# Patient Record
Sex: MALE | Race: WHITE | NOT HISPANIC OR LATINO | Employment: OTHER | URBAN - METROPOLITAN AREA
[De-identification: names, ages, dates, MRNs, and addresses within clinical notes are randomized per-mention and may not be internally consistent; named-entity substitution may affect disease eponyms.]

---

## 2017-01-18 ENCOUNTER — OFFICE VISIT (OUTPATIENT)
Dept: PEDIATRICS | Facility: CLINIC | Age: 67
End: 2017-01-18
Payer: COMMERCIAL

## 2017-01-18 ENCOUNTER — RADIANT APPOINTMENT (OUTPATIENT)
Dept: GENERAL RADIOLOGY | Facility: CLINIC | Age: 67
End: 2017-01-18
Attending: PHYSICIAN ASSISTANT
Payer: COMMERCIAL

## 2017-01-18 VITALS
TEMPERATURE: 98 F | HEIGHT: 68 IN | WEIGHT: 212.1 LBS | HEART RATE: 76 BPM | OXYGEN SATURATION: 96 % | SYSTOLIC BLOOD PRESSURE: 128 MMHG | BODY MASS INDEX: 32.15 KG/M2 | DIASTOLIC BLOOD PRESSURE: 70 MMHG

## 2017-01-18 DIAGNOSIS — J06.9 VIRAL URI WITH COUGH: Primary | ICD-10-CM

## 2017-01-18 DIAGNOSIS — R05.9 COUGH: ICD-10-CM

## 2017-01-18 DIAGNOSIS — J02.9 ACUTE PHARYNGITIS, UNSPECIFIED ETIOLOGY: ICD-10-CM

## 2017-01-18 LAB
DEPRECATED S PYO AG THROAT QL EIA: NORMAL
MICRO REPORT STATUS: NORMAL
SPECIMEN SOURCE: NORMAL

## 2017-01-18 PROCEDURE — 99213 OFFICE O/P EST LOW 20 MIN: CPT | Performed by: PHYSICIAN ASSISTANT

## 2017-01-18 PROCEDURE — 87081 CULTURE SCREEN ONLY: CPT | Performed by: PHYSICIAN ASSISTANT

## 2017-01-18 PROCEDURE — 71020 XR CHEST 2 VW: CPT

## 2017-01-18 PROCEDURE — 87880 STREP A ASSAY W/OPTIC: CPT | Performed by: PHYSICIAN ASSISTANT

## 2017-01-18 RX ORDER — MULTIPLE VITAMINS W/ MINERALS TAB 9MG-400MCG
1 TAB ORAL DAILY
Qty: 100 TABLET | Refills: 3 | COMMUNITY
Start: 2017-01-18 | End: 2020-01-10

## 2017-01-18 NOTE — PROGRESS NOTES
"  SUBJECTIVE:                                                    Rufus Elena is a 67 year old male who presents to clinic today for the following health issues:    Acute Illness   Acute illness concerns: cold sx  Onset: 3 weeks ago and worsened over the past three days    Fever: no    Chills/Sweats: no    Headache (location?): no    Sinus Pressure:no    Conjunctivitis:  no    Ear Pain: no    Rhinorrhea: YES    Congestion: YES    Sore Throat: YES     Cough: YES-productive of yellow sputum, productive of green sputum    Intermittently to clear throat    Wheeze: YES    Decreased Appetite: YES    Nausea: no    Vomiting: no    Diarrhea:  no    Dysuria/Freq.: YES- just had prostate surgery    Fatigue/Achiness: YES- fatigue    Sick/Strep Exposure: YES     Therapies Tried and outcome: ibuprofen    History of asthma in childhood. History of allergies. No history of bronchitis.     Problem list, Medication list, Allergies, and Medical/Social/Surgical histories reviewed in [facility] and updated as appropriate.    ROS:  ROS otherwise negative    OBJECTIVE:                                                    /70 mmHg  Pulse 76  Temp(Src) 98  F (36.7  C) (Oral)  Ht 5' 8\" (1.727 m)  Wt 212 lb 1.6 oz (96.208 kg)  BMI 32.26 kg/m2  SpO2 96%  Body mass index is 32.26 kg/(m^2).   GENERAL: alert, no distress  HENT: ear canals- normal; TMs- normal; Nose- normal; Mouth- erythemic posterior pharynx; no sinus tenderness   NECK: tonsillar LAD  RESP: lungs clear to auscultation - no rales, no rhonchi, no wheezes  CV: regular rates and rhythm, normal S1 S2, no S3 or S4 and no murmur, no click or rub    Diagnostic test results:  CXR appears NEGATIVE. Radiology review pending.  Results for orders placed or performed in visit on 01/18/17 (from the past 24 hour(s))   Strep, Rapid Screen   Result Value Ref Range    Specimen Description Throat     Rapid Strep A Screen       NEGATIVE: No Group A streptococcal antigen detected by immunoassay, " await   culture report.      Micro Report Status FINAL 01/18/2017         ASSESSMENT/PLAN:                                                    (J06.9,  B97.89) Viral URI with cough  (primary encounter diagnosis)  Comment: continue with symptomatic treatment.   Plan: XR Chest 2 Views            (J02.9) Acute pharyngitis, unspecified etiology  Comment: TC pending.   Plan: Strep, Rapid Screen, Beta strep group A culture            See Patient Instructions    Jade Baker PA-C  [facility]

## 2017-01-18 NOTE — MR AVS SNAPSHOT
After Visit Summary   1/18/2017    Rufus Elena    MRN: [id number]           Patient Information     Date Of Birth          1950        Visit Information        Provider Department      1/18/2017 2:30 PM Jade Baker PA-C [facility]        Today's Diagnoses     Acute pharyngitis, unspecified etiology    -  1     Cough           Care Instructions    Call with any persistent or worsening symptoms   Emily [phone]        Follow-ups after your visit        Your next 10 appointments already scheduled     Jan 30, 2017  9:00 AM   Return Visit with Ryan Ruiz MD   [facility] ([facility])    [address]  [address]  [facility] [address] [phone]              Who to contact     If you have questions or need follow up information about today's clinic visit or your schedule please contact [facility] directly at [phone].  Normal or non-critical lab and imaging results will be communicated to you by Promoboxxhart, letter or phone within 4 business days after the clinic has received the results. If you do not hear from us within 7 days, please contact the clinic through Promoboxxhart or phone. If you have a critical or abnormal lab result, we will notify you by phone as soon as possible.  Submit refill requests through Arrogene or call your pharmacy and they will forward the refill request to us. Please allow 3 business days for your refill to be completed.          Additional Information About Your Visit        MyChart Information     Arrogene gives you secure access to your electronic health record. If you see a primary care provider, you can also send messages to your care team and make appointments. If you have questions, please call your primary care clinic.  If you do not have a primary care provider, please call [phone] and they will assist you.        Care EveryWhere ID   "   This is your Care EveryWhere ID. This could be used by other organizations to access your [facility] medical records  [id number]        Your Vitals Were     Pulse Temperature Height BMI (Body Mass Index) Pulse Oximetry       76 98  F (36.7  C) (Oral) 5' 8\" (1.727 m) 32.26 kg/m2 96%        Blood Pressure from Last 3 Encounters:   01/18/17 128/70   12/14/16 147/74   11/28/16 124/68    Weight from Last 3 Encounters:   01/18/17 212 lb 1.6 oz (96.208 kg)   12/19/16 208 lb (94.348 kg)   12/13/16 213 lb 9.6 oz (96.888 kg)              We Performed the Following     Beta strep group A culture     Strep, Rapid Screen          Today's Medication Changes          These changes are accurate as of: 1/18/17  2:57 PM.  If you have any questions, ask your nurse or doctor.               Stop taking these medicines if you haven't already. Please contact your care team if you have questions.     ciprofloxacin 250 MG tablet   Commonly known as:  CIPRO   Stopped by:  Jade Baker PA-C           finasteride 5 MG tablet   Commonly known as:  PROSCAR   Stopped by:  Jade Baker PA-C           neomycin-bacitracin-polymyxin 5-400-5000 ointment   Stopped by:  Jade Baker PA-C           opium-belladonna 30-16.2 MG per suppository   Commonly known as:  B&O SUPPRETTES   Stopped by:  Jade Baker PA-C                    Primary Care Provider Office Phone # Fax #    Remi Wyman [phone]       [facility] [address] [place] [address]        Thank you!     Thank you for choosing [facility]  for your care. Our goal is always to provide you with excellent care. Hearing back from our patients is one way we can continue to improve our services. Please take a few minutes to complete the written survey that you may receive in the mail after your visit with us. Thank you!             Your Updated Medication List - Protect others around you: " Learn how to safely use, store and throw away your medicines at www.disposemymeds.org.          This list is accurate as of: 1/18/17  2:57 PM.  Always use your most recent med list.                   Brand Name Dispense Instructions for use    aspirin 81 MG EC tablet     90 tablet    Take 1 tablet (81 mg) by mouth daily       atorvastatin 40 MG tablet    LIPITOR    90 tablet    Take 0.5 tablets (20 mg) by mouth daily       loratadine 10 MG tablet    CLARITIN     Take 10 mg by mouth as needed for allergies       Multi-vitamin Tabs tablet     100 tablet    Take 1 tablet by mouth daily

## 2017-01-18 NOTE — NURSING NOTE
"Chief Complaint   Patient presents with     URI       Initial /70 mmHg  Pulse 76  Temp(Src) 98  F (36.7  C) (Oral)  Ht 5' 8\" (1.727 m)  Wt 212 lb 1.6 oz (96.208 kg)  BMI 32.26 kg/m2  SpO2 96% Estimated body mass index is 32.26 kg/(m^2) as calculated from the following:    Height as of this encounter: 5' 8\" (1.727 m).    Weight as of this encounter: 212 lb 1.6 oz (96.208 kg).  BP completed using cuff size: regular    "

## 2017-01-20 LAB
BACTERIA SPEC CULT: NORMAL
MICRO REPORT STATUS: NORMAL
SPECIMEN SOURCE: NORMAL

## 2017-01-30 ENCOUNTER — OFFICE VISIT (OUTPATIENT)
Dept: UROLOGY | Facility: CLINIC | Age: 67
End: 2017-01-30
Payer: COMMERCIAL

## 2017-01-30 VITALS — HEART RATE: 76 BPM | OXYGEN SATURATION: 97 % | WEIGHT: 210 LBS | BODY MASS INDEX: 31.94 KG/M2

## 2017-01-30 DIAGNOSIS — N40.0 ENLARGED PROSTATE: Primary | ICD-10-CM

## 2017-01-30 LAB
ALBUMIN UR-MCNC: 100 MG/DL
APPEARANCE UR: CLEAR
BILIRUB UR QL STRIP: NEGATIVE
COLOR UR AUTO: YELLOW
GLUCOSE UR STRIP-MCNC: NEGATIVE MG/DL
HGB UR QL STRIP: ABNORMAL
KETONES UR STRIP-MCNC: NEGATIVE MG/DL
LEUKOCYTE ESTERASE UR QL STRIP: ABNORMAL
NITRATE UR QL: NEGATIVE
PH UR STRIP: 5.5 PH (ref 5–7)
SP GR UR STRIP: >1.03 (ref 1–1.03)
URN SPEC COLLECT METH UR: ABNORMAL
UROBILINOGEN UR STRIP-ACNC: 0.2 EU/DL (ref 0.2–1)

## 2017-01-30 PROCEDURE — 99024 POSTOP FOLLOW-UP VISIT: CPT | Performed by: UROLOGY

## 2017-01-30 PROCEDURE — 51798 US URINE CAPACITY MEASURE: CPT | Performed by: UROLOGY

## 2017-01-30 PROCEDURE — 81003 URINALYSIS AUTO W/O SCOPE: CPT | Performed by: UROLOGY

## 2017-01-30 ASSESSMENT — PAIN SCALES - GENERAL: PAINLEVEL: NO PAIN (0)

## 2017-01-30 NOTE — Clinical Note
1/30/2017      RE: Rufus Elena  [address]  [facility] [address]       Office Visit Note  [facility]  [phone]    UROLOGIC DIAGNOSES:   Enlarged prostate and incomplete bladder emptying    CURRENT INTERVENTIONS:   S/P laser TURP    HISTORY:   Rufus returns to clinic today for urinalysis and bladder scan. He reports feeling very well.  He has a strong urinary stream.  No urgency or nocturia.  He is very happy with his urinary symptoms.      PAST MEDICAL HISTORY:   Past Medical History   Diagnosis Date     Benign non-nodular prostatic hyperplasia with lower urinary tract symptoms 11/9/2015     Hypertrophic cardiomyopathy (H)      Uncomplicated asthma      as child       PAST SURGICAL HISTORY:   Past Surgical History   Procedure Laterality Date     Orthopedic surgery       knee scopes bilateral     Laser revolix photoselective vaporization prostate N/A 12/13/2016     Procedure: LASER REVOLIX / QUANTA PHOTOSELECTIVE VAPORIZATION PROSTATE;  Surgeon: Ryan Ruiz MD;  Location:  OR       FAMILY HISTORY:   Family History   Problem Relation Age of Onset     CANCER Mother      Pancreatic     CANCER Father      Bladder       SOCIAL HISTORY:   Social History   Substance Use Topics     Smoking status: Never Smoker      Smokeless tobacco: Never Used     Alcohol Use: Yes      Comment: 15-20 beers per week       Current Outpatient Prescriptions   Medication     multivitamin, therapeutic with minerals (MULTI-VITAMIN) TABS tablet     aspirin 81 MG EC tablet     atorvastatin (LIPITOR) 40 MG tablet     loratadine (CLARITIN) 10 MG tablet     No current facility-administered medications for this visit.         PHYSICAL EXAM:    There were no vitals taken for this visit.    HEENT: Normocephalic and atraumatic   Cardiac: Not done  Back/Flank: Not done  CNS/PNS: Not done  Respiratory: Normal non-labored breathing  Abdomen: Soft nontender and nondistended  Peripheral Vascular: Not done  Mental  Status: Not done    Penis: Not done  Scrotal Skin: Not done  Testicles: Not done  Epididymis: Not done  Digital Rectal Exam:     Cystoscopy: Not done    Imaging: None    Urinalysis: UA RESULTS:  Recent Labs   Lab Test  11/13/14   1043   COLOR  Yellow   APPEARANCE  Clear   URINEGLC  Negative   URINEBILI  Negative   URINEKETONE  Negative   SG  1.025   UBLD  Negative   URINEPH  6.0   PROTEIN  Negative   UROBILINOGEN  0.2   NITRITE  Negative   LEUKEST  Negative       PSA:     Post Void Residual: 10mL    Other labs: None today      IMPRESSION:  Doing well    PLAN:  He is doing very well after laser TURP.  I will plan on seeing him back again in 1 year for a PSA urinalysis and bladder scan.    Total Time: 10 minutes                                      Total in Consultation: 10 minutes       Ryan Ruzi M.D.

## 2017-01-30 NOTE — PROGRESS NOTES
Office Visit Note  [facility]  [phone]    UROLOGIC DIAGNOSES:   Enlarged prostate and incomplete bladder emptying    CURRENT INTERVENTIONS:   S/P laser TURP    HISTORY:   Rufus returns to clinic today for urinalysis and bladder scan. He reports feeling very well.  He has a strong urinary stream.  No urgency or nocturia.  He is very happy with his urinary symptoms.      PAST MEDICAL HISTORY:   Past Medical History   Diagnosis Date     Benign non-nodular prostatic hyperplasia with lower urinary tract symptoms 11/9/2015     Hypertrophic cardiomyopathy (H)      Uncomplicated asthma      as child       PAST SURGICAL HISTORY:   Past Surgical History   Procedure Laterality Date     Orthopedic surgery       knee scopes bilateral     Laser revolix photoselective vaporization prostate N/A 12/13/2016     Procedure: LASER REVOLIX / QUANTA PHOTOSELECTIVE VAPORIZATION PROSTATE;  Surgeon: Ryan Ruiz MD;  Location:  OR       FAMILY HISTORY:   Family History   Problem Relation Age of Onset     CANCER Mother      Pancreatic     CANCER Father      Bladder       SOCIAL HISTORY:   Social History   Substance Use Topics     Smoking status: Never Smoker      Smokeless tobacco: Never Used     Alcohol Use: Yes      Comment: 15-20 beers per week       Current Outpatient Prescriptions   Medication     multivitamin, therapeutic with minerals (MULTI-VITAMIN) TABS tablet     aspirin 81 MG EC tablet     atorvastatin (LIPITOR) 40 MG tablet     loratadine (CLARITIN) 10 MG tablet     No current facility-administered medications for this visit.         PHYSICAL EXAM:    There were no vitals taken for this visit.    HEENT: Normocephalic and atraumatic   Cardiac: Not done  Back/Flank: Not done  CNS/PNS: Not done  Respiratory: Normal non-labored breathing  Abdomen: Soft nontender and nondistended  Peripheral Vascular: Not done  Mental Status: Not done    Penis: Not done  Scrotal Skin: Not done  Testicles: Not  done  Epididymis: Not done  Digital Rectal Exam:     Cystoscopy: Not done    Imaging: None    Urinalysis: UA RESULTS:  Recent Labs   Lab Test  11/13/14   1043   COLOR  Yellow   APPEARANCE  Clear   URINEGLC  Negative   URINEBILI  Negative   URINEKETONE  Negative   SG  1.025   UBLD  Negative   URINEPH  6.0   PROTEIN  Negative   UROBILINOGEN  0.2   NITRITE  Negative   LEUKEST  Negative       PSA:     Post Void Residual: 10mL    Other labs: None today      IMPRESSION:  Doing well    PLAN:  He is doing very well after laser TURP.  I will plan on seeing him back again in 1 year for a PSA urinalysis and bladder scan.    Total Time: 10 minutes                                      Total in Consultation: 10 minutes       Ryan Ruiz M.D.

## 2017-02-22 ENCOUNTER — DOCUMENTATION ONLY (OUTPATIENT)
Dept: CARDIOLOGY | Facility: CLINIC | Age: 67
End: 2017-02-22

## 2017-02-22 DIAGNOSIS — E78.5 HLD (HYPERLIPIDEMIA): Primary | ICD-10-CM

## 2017-02-22 DIAGNOSIS — R93.1 HIGH CORONARY ARTERY CALCIUM SCORE: ICD-10-CM

## 2017-02-22 DIAGNOSIS — E78.00 PURE HYPERCHOLESTEROLEMIA: ICD-10-CM

## 2017-02-22 LAB
ALT SERPL W P-5'-P-CCNC: 33 U/L (ref 0–70)
CHOLEST SERPL-MCNC: 189 MG/DL
HDLC SERPL-MCNC: 65 MG/DL
LDLC SERPL CALC-MCNC: 107 MG/DL
NONHDLC SERPL-MCNC: 124 MG/DL
TRIGL SERPL-MCNC: 85 MG/DL

## 2017-02-22 PROCEDURE — 84460 ALANINE AMINO (ALT) (SGPT): CPT | Performed by: INTERNAL MEDICINE

## 2017-02-22 PROCEDURE — 80061 LIPID PANEL: CPT | Performed by: INTERNAL MEDICINE

## 2017-02-22 PROCEDURE — 36415 COLL VENOUS BLD VENIPUNCTURE: CPT | Performed by: INTERNAL MEDICINE

## 2017-02-22 NOTE — PROGRESS NOTES
Results of lipid panel 2/22/17 shown below. Drawn in follow up from last OV 10/6/16 as Lipitor was decreased from 40 mg daily to 20 mg daily due to myalgias at that time. Will route to Dr. Burciaga for review. JOSE Dawson RN    Component      Latest Ref Rng & Units 2/22/2017   Cholesterol      <200 mg/dL 189   Triglycerides      <150 mg/dL 85   HDL Cholesterol      >39 mg/dL 65   LDL Cholesterol Calculated      <100 mg/dL 107 (H)   Non HDL Cholesterol      <130 mg/dL 124   ALT      0 - 70 U/L 33

## 2017-02-22 NOTE — PROGRESS NOTES
Thanks, LDL better than 2015. Continue lipitor, ideally would have increased dose to 40 mg qd but for now continue 20 mg qd to avoid intolerant myalgias.    Cole    If he increases lipitor I recommend a repeat lipid panel with alt in 2 months.    Thanks  Cole

## 2017-02-23 NOTE — PROGRESS NOTES
Call made to patient to update him with results of lipid panel from 2/22/17 and recommendations per Dr. Burciaga below. Patient stated that he has been tolerating Lipitor 20 mg daily well and he would now like to try the increased dose at 40 mg one more time. I advised that if his myalgias or aches return that he give us a call. Will route to Dr. Burciaga for review. JOSE Dawson RN    Order placed for recheck of fasting lipids and ALT in 2 months per Dr. Burciaga. Call made to patient to update him. Unable to reach pt. Left message requesting call back. JOSE Dawson, RN

## 2017-05-04 ENCOUNTER — TELEPHONE (OUTPATIENT)
Dept: CARDIOLOGY | Facility: CLINIC | Age: 67
End: 2017-05-04

## 2017-05-04 NOTE — TELEPHONE ENCOUNTER
Voice message left of overdue labs due, after increase in statin by Dr. Burciaga. On 2/23/17 from lipitor 20 mg daily to 40 mg daily.   Call back number left.

## 2017-05-18 ENCOUNTER — TELEPHONE (OUTPATIENT)
Dept: CARDIOLOGY | Facility: CLINIC | Age: 67
End: 2017-05-18

## 2017-05-18 NOTE — TELEPHONE ENCOUNTER
Patient left voice message he is questioning why he needed to have fasting lipids done last labs in February 2017.  Reviewed chart and left V.M. Reason for labs is due to statin change lipitor 20mg to 40mg and recommend FLPand ALT 6-8 weeks after medication change to make sure he is on the correct statin dose.  Ok to have done at primary office.  Instructed patient to call back if further questions.

## 2017-06-09 ENCOUNTER — TELEPHONE (OUTPATIENT)
Dept: CARDIOLOGY | Facility: CLINIC | Age: 67
End: 2017-06-09

## 2017-06-09 DIAGNOSIS — E78.5 HLD (HYPERLIPIDEMIA): ICD-10-CM

## 2017-06-09 DIAGNOSIS — E78.5 HYPERLIPIDEMIA WITH TARGET LDL LESS THAN 100: Primary | ICD-10-CM

## 2017-06-09 LAB
ALT SERPL W P-5'-P-CCNC: 28 U/L (ref 0–70)
CHOLEST SERPL-MCNC: 169 MG/DL
HDLC SERPL-MCNC: 61 MG/DL
LDLC SERPL CALC-MCNC: 98 MG/DL
NONHDLC SERPL-MCNC: 108 MG/DL
TRIGL SERPL-MCNC: 48 MG/DL

## 2017-06-09 PROCEDURE — 80061 LIPID PANEL: CPT | Performed by: INTERNAL MEDICINE

## 2017-06-09 PROCEDURE — 84460 ALANINE AMINO (ALT) (SGPT): CPT | Performed by: INTERNAL MEDICINE

## 2017-06-09 PROCEDURE — 36415 COLL VENOUS BLD VENIPUNCTURE: CPT | Performed by: INTERNAL MEDICINE

## 2017-06-09 NOTE — TELEPHONE ENCOUNTER
"Lipids done. Questioning dosage of Lipitor. Per telephone note from 2/23/17, patient was going to try to increase the dosage from 20 mg to 40 mg daily at that time.  Called patient states he is taking the 40 mg daily. Does have some Myalgias when on the 40 mg daily, which he did not have on the 20 mg dose. In the back and large muscles, but stated \" I can tolerate it\".   LDL was 107 on 20 mg of Lipitor, Now LDL 98 on Lipitor 40 mg daily.  Will send to Dr. Burciaga to review and advise.   Next f/u due October 2017 with ECHO.      "

## 2017-06-12 NOTE — TELEPHONE ENCOUNTER
I recommend checking CK- if normal and symptoms tolerable then continue 40 mg qd of lipitor.    Thanks  Cole

## 2017-07-12 DIAGNOSIS — E78.5 HYPERLIPIDEMIA WITH TARGET LDL LESS THAN 100: ICD-10-CM

## 2017-07-12 PROCEDURE — 82550 ASSAY OF CK (CPK): CPT | Performed by: INTERNAL MEDICINE

## 2017-07-12 PROCEDURE — 36415 COLL VENOUS BLD VENIPUNCTURE: CPT | Performed by: INTERNAL MEDICINE

## 2017-07-13 LAB — CK SERPL-CCNC: 99 U/L (ref 30–300)

## 2017-10-13 DIAGNOSIS — E78.00 PURE HYPERCHOLESTEROLEMIA: ICD-10-CM

## 2017-10-13 DIAGNOSIS — R93.1 HIGH CORONARY ARTERY CALCIUM SCORE: ICD-10-CM

## 2017-10-13 NOTE — TELEPHONE ENCOUNTER
Called pt - left voice message  Request for atorvastatin refill from [facility] stating pt takes 40mg daily  EPIC notes pt takes 1/2tab atorvastatin 40mg=20mg daily  Left request for pt to call back to clarify.

## 2017-10-24 RX ORDER — ATORVASTATIN CALCIUM 40 MG/1
40 TABLET, FILM COATED ORAL DAILY
Qty: 90 TABLET | Refills: 3 | Status: SHIPPED | OUTPATIENT
Start: 2017-10-24 | End: 2018-11-07

## 2017-10-24 NOTE — TELEPHONE ENCOUNTER
Pt called back -  Taking atorvastatin 40mg every day.  Tolerating well.  Rx to [facility] per pt request.

## 2017-10-27 ENCOUNTER — HOSPITAL ENCOUNTER (OUTPATIENT)
Dept: CARDIOLOGY | Facility: CLINIC | Age: 67
Discharge: HOME | End: 2017-10-27
Attending: INTERNAL MEDICINE | Admitting: INTERNAL MEDICINE
Payer: COMMERCIAL

## 2017-10-27 ENCOUNTER — TELEPHONE (OUTPATIENT)
Dept: CARDIOLOGY | Facility: CLINIC | Age: 67
End: 2017-10-27

## 2017-10-27 DIAGNOSIS — I77.819 AORTIC DILATATION (H): ICD-10-CM

## 2017-10-27 PROCEDURE — 93306 TTE W/DOPPLER COMPLETE: CPT | Mod: 26 | Performed by: INTERNAL MEDICINE

## 2017-10-27 PROCEDURE — 25500064 ZZH RX 255 OP 636: Performed by: INTERNAL MEDICINE

## 2017-10-27 PROCEDURE — 40000264 ECHO COMPLETE WITH OPTISON

## 2017-10-27 RX ADMIN — HUMAN ALBUMIN MICROSPHERES AND PERFLUTREN 6 ML: 10; .22 INJECTION, SOLUTION INTRAVENOUS at 15:00

## 2017-10-27 NOTE — TELEPHONE ENCOUNTER
10- Echo results Has a Follow up OV w Dr. Burciaga 11-  Interpretation Summary     There is moderate concentric left ventricular hypertrophy. Apical hypertrophy  is present. These findings are consistent with apical hypertrophic  cardiomyopathy. Left ventricular systolic function is normal. The visual  ejection fraction is estimated at 60-65%. No regional wall motion  abnormalities noted. There is no thrombus seen in the left ventricle.  The right ventricle is normal size. The right ventricular systolic function is  normal.  Trace to mild mitral and tricuspid regurgitation.  Mild dilatation of the aortic root and ascending thoracic aorta.  No pericardial effusion.  In comparison to the previous report dated 09/14/2016, the findings are  similar.  _____________________________________________________________________________    Patient notified results via My Chart

## 2017-11-22 ENCOUNTER — OFFICE VISIT (OUTPATIENT)
Dept: CARDIOLOGY | Facility: CLINIC | Age: 67
End: 2017-11-22
Attending: INTERNAL MEDICINE
Payer: COMMERCIAL

## 2017-11-22 VITALS
HEART RATE: 60 BPM | SYSTOLIC BLOOD PRESSURE: 131 MMHG | HEIGHT: 68 IN | BODY MASS INDEX: 32.52 KG/M2 | DIASTOLIC BLOOD PRESSURE: 84 MMHG | WEIGHT: 214.6 LBS

## 2017-11-22 DIAGNOSIS — I42.2 APICAL VARIANT HYPERTROPHIC CARDIOMYOPATHY (H): Primary | ICD-10-CM

## 2017-11-22 DIAGNOSIS — I77.810 AORTIC ROOT DILATATION (H): ICD-10-CM

## 2017-11-22 DIAGNOSIS — I42.2 HYPERTROPHIC CARDIOMYOPATHY (H): ICD-10-CM

## 2017-11-22 DIAGNOSIS — E78.5 HYPERLIPIDEMIA WITH TARGET LDL LESS THAN 100: ICD-10-CM

## 2017-11-22 PROCEDURE — 99214 OFFICE O/P EST MOD 30 MIN: CPT | Performed by: INTERNAL MEDICINE

## 2017-11-22 RX ORDER — MV-MIN/FA/VIT K/LUTEIN/ZEAXANT 200MCG-5MG
CAPSULE ORAL
COMMUNITY

## 2017-11-22 NOTE — PROGRESS NOTES
REASON FOR CLINIC VISIT:  Followup, mildly dilated aortic root, ascending aorta, hypertrophic cardiomyopathy.      HISTORY OF PRESENT ILLNESS:  Mr. Elena is a very pleasant 67-year-old gentleman with history of apical hypertrophic cardiomyopathy diagnosed more than 10 years ago in [place] and mildly dilated aortic root and ascending aorta measuring 3.9 cm.  Today is coming for routine followup.      Repeat echocardiogram shows stable size of aortic root and ascending aorta.  He also has mild nonobstructive coronary artery disease on the basis of coronary CT angiogram that showed 25% of LAD disease with trivial plaque in the left circumflex and right coronary with calcium score of 413, which was 80th percentile for his age.  In terms of sudden cardiac death risk stratification in the setting of hypertrophic cardiomyopathy, the maximal LV thickness is 16 mm.  There are some small patchy delayed enhancement along the left ventricular apex.  No NSVT on Holter evaluation. No personal history of syncope family of sudden cardiac death and normal blood pressure response to exercise.  He also has dyslipidemia with LDL of 135.  He was put on Lipitor 40 mg daily, but then due to some myalgias, he decreased the dose to 20 mg, but his LDL was still elevated and he went back to 40 mg daily.  The latest lipid panel shows LDL of 98.        The patient tells me that overall he feels quite well.  He is fairly active.  He walks at least 2 miles.  No chest discomfort or shortness or with physical activity.  No presyncope, syncope or dizziness.  He still notes some overall generalized body aches over the shoulders and legs.  No tobacco exposure.  To be noted, his son has also been diagnosed with hypertrophic cardiomyopathy and works as an internist in [place].      PHYSICAL EXAMINATION:   VITAL SIGNS:  Blood pressure 131/84, heart rate 60 regular, weight 214 pounds, BMI 32.7.   GENERAL:  The patient appears pleasant, comfortable.   NECK:   Normal JVP, no bruit.   CARDIOVASCULAR SYSTEM:  S1, S2 normal, no murmur, rub or gallop.   RESPIRATORY SYSTEM:  Clear to auscultation bilaterally.     ABDOMEN:  Soft, nontender.   EXTREMITIES:  No pitting pedal edema.   NEUROLOGICAL:  Alert and oriented x3.   PSYCH:  Normal affect.   SKIN:  No obvious rash.     HEENT:  No pallor, icterus.      ASSESSMENT AND PLAN:  A very pleasant 67-year-old gentleman with apical hypertrophic cardiomyopathy, essentially asymptomatic from it with no major risk features for sudden cardiac death as noted above, no personal history of syncope.  Overall cardiac status-wise he is doing quite well.  He has mildly dilated ascending aorta, aortic root and mild nonobstructive coronary artery disease.  Some of the symptoms are suggestive of statin-induced myalgias and I recommend a statin holiday for 7-10 days and if he recovers completely from the symptoms, he should call our clinic and I would recommend using an alternative statin, like pravastatin.  In case he does not notice significant improvement in symptoms, he should resume Lipitor at the current dose.        Regarding mildly dilated aortic root and ascending aorta, they appear stable in size and we can recheck them in about 2 years' time and I can see him in the clinic at that time, sooner if any change in clinical status, especially if any exertionally related symptoms.         JOSE FRANCISCO WEI MD             D: 2017 13:30   T: 2017 14:31   MT: [id number]      Name:     TONI SWANN   MRN:      [id number]        Account:      [id number]   :      1950           Service Date: 2017      Document: [id number]

## 2017-11-22 NOTE — PROGRESS NOTES
HPI and Plan:   See dictation(#[id number])    Orders Placed This Encounter   Procedures     Follow-Up with Cardiologist     Echocardiogram       Orders Placed This Encounter   Medications     Multiple Vitamins-Minerals (PRESERVISION AREDS 2+MULTI VIT) CAPS     Sig: Dose is unknown but takes 1 pill a day       There are no discontinued medications.      Encounter Diagnoses   Name Primary?     Hypertrophic cardiomyopathy (H)      Apical variant hypertrophic cardiomyopathy (H) Yes     Hyperlipidemia with target LDL less than 100      Aortic root dilatation (H)        CURRENT MEDICATIONS:  Current Outpatient Prescriptions   Medication Sig Dispense Refill     Multiple Vitamins-Minerals (PRESERVISION AREDS 2+MULTI VIT) CAPS Dose is unknown but takes 1 pill a day       atorvastatin (LIPITOR) 40 MG tablet Take 1 tablet (40 mg) by mouth daily 90 tablet 3     multivitamin, therapeutic with minerals (MULTI-VITAMIN) TABS tablet Take 1 tablet by mouth daily 100 tablet 3     aspirin 81 MG EC tablet Take 1 tablet (81 mg) by mouth daily 90 tablet 3     loratadine (CLARITIN) 10 MG tablet Take 10 mg by mouth as needed for allergies         ALLERGIES     Allergies   Allergen Reactions     Seasonal Allergies        PAST MEDICAL HISTORY:  Past Medical History:   Diagnosis Date     Benign non-nodular prostatic hyperplasia with lower urinary tract symptoms 11/9/2015     Hypertrophic cardiomyopathy (H)      Uncomplicated asthma     as child       PAST SURGICAL HISTORY:  Past Surgical History:   Procedure Laterality Date     LASER REVOLIX PHOTOSELECTIVE VAPORIZATION PROSTATE N/A 12/13/2016    Procedure: LASER REVOLIX / QUANTA PHOTOSELECTIVE VAPORIZATION PROSTATE;  Surgeon: Ryan Ruiz MD;  Location: [facility]     ORTHOPEDIC SURGERY      knee scopes bilateral     PROSTATE SURGERY         FAMILY HISTORY:  Family History   Problem Relation Age of Onset     CANCER Mother      Pancreatic     CANCER Father      Bladder       SOCIAL  "HISTORY:  Social History     Social History     Marital status:      Spouse name: N/A     Number of children: N/A     Years of education: N/A     Social History Main Topics     Smoking status: Never Smoker     Smokeless tobacco: Never Used     Alcohol use Yes      Comment: 15-20 beers per week     Drug use: No     Sexual activity: Yes     Partners: Female     Other Topics Concern     Caffeine Concern No     3 coffee in the mornings     Exercise Yes     walking/biking- 2 hours a day.     Social History Narrative       Review of Systems:  Skin:  Negative       Eyes:  Positive for contacts    ENT:  Negative      Respiratory:  Negative       Cardiovascular:  Negative      Gastroenterology: Negative      Genitourinary:  not assessed      Musculoskeletal:  Positive for joint pain    Neurologic:  Negative      Psychiatric:  Negative      Heme/Lymph/Imm:  Positive for allergies    Endocrine:  Negative        Physical Exam:  Vitals: /84  Pulse 60  Ht 1.727 m (5' 8\")  Wt 97.3 kg (214 lb 9.6 oz)  BMI 32.63 kg/m2    Constitutional:           Skin:             Head:           Eyes:           Lymph:      ENT:           Neck:           Respiratory:            Cardiac:                                                           GI:           Extremities and Muscular Skeletal:                 Neurological:           Psych:             CC  Cole Burciaga MD  [address]  [place] [address]                  "

## 2017-11-22 NOTE — MR AVS SNAPSHOT
After Visit Summary   11/22/2017    Rufus Elena    MRN: [id number]           Patient Information     Date Of Birth          1950        Visit Information        Provider Department      11/22/2017 12:45 PM Cole Burciaga MD [facility]        Today's Diagnoses     Apical variant hypertrophic cardiomyopathy (H)    -  1    Hypertrophic cardiomyopathy (H)        Hyperlipidemia with target LDL less than 100        Aortic root dilatation (H)           Follow-ups after your visit        Additional Services     Follow-Up with Cardiologist                 Future tests that were ordered for you today     Open Future Orders        Priority Expected Expires Ordered    Echocardiogram Routine 11/12/2019 11/23/2019 11/22/2017    Follow-Up with Cardiologist Routine 11/22/2019 12/12/2019 11/22/2017            Who to contact     If you have questions or need follow up information about today's clinic visit or your schedule please contact [facility] directly at [phone].  Normal or non-critical lab and imaging results will be communicated to you by BayPacketshart, letter or phone within 4 business days after the clinic has received the results. If you do not hear from us within 7 days, please contact the clinic through Electronic Braillert or phone. If you have a critical or abnormal lab result, we will notify you by phone as soon as possible.  Submit refill requests through Embedded Chat or call your pharmacy and they will forward the refill request to us. Please allow 3 business days for your refill to be completed.          Additional Information About Your Visit        MyChart Information     Embedded Chat gives you secure access to your electronic health record. If you see a primary care provider, you can also send messages to your care team and make appointments. If you have questions, please call your primary care clinic.  If you do not have a primary care  "provider, please call [phone] and they will assist you.        Care EveryWhere ID     This is your Care EveryWhere ID. This could be used by other organizations to access your [place] medical records  [id number]        Your Vitals Were     Pulse Height BMI (Body Mass Index)             60 1.727 m (5' 8\") 32.63 kg/m2          Blood Pressure from Last 3 Encounters:   11/22/17 131/84   01/18/17 128/70   12/14/16 147/74    Weight from Last 3 Encounters:   11/22/17 97.3 kg (214 lb 9.6 oz)   01/30/17 95.3 kg (210 lb)   01/18/17 96.2 kg (212 lb 1.6 oz)              We Performed the Following     Follow-Up with Cardiologist        Primary Care Provider Office Phone # Fax #    Remi Wyman [phone] [address] [place] [address]        Equal Access to Services     [facility]: Hadii aad ku hadasho Soomaali, waaxda luqadaha, qaybta kaalmada adeegyada, waxay nicholein hayjob jimenez . So [facility] [phone].    ATENCIÓN: Si habla español, tiene a proctor disposición servicios gratuitos de asistencia lingüística. Llame al [phone].    We comply with applicable federal civil rights laws and [place] laws. We do not discriminate on the basis of race, color, national origin, age, disability, sex, sexual orientation, or gender identity.            Thank you!     Thank you for choosing [facility]  for your care. Our goal is always to provide you with excellent care. Hearing back from our patients is one way we can continue to improve our services. Please take a few minutes to complete the written survey that you may receive in the mail after your visit with us. Thank you!             Your Updated Medication List - Protect others around you: Learn how to safely use, store and throw away your medicines at www.disposemymeds.org.          This list is accurate as of: 11/22/17  1:20 PM.  Always use your most recent med list.             "       Brand Name Dispense Instructions for use Diagnosis    aspirin 81 MG EC tablet     90 tablet    Take 1 tablet (81 mg) by mouth daily        atorvastatin 40 MG tablet    LIPITOR    90 tablet    Take 1 tablet (40 mg) by mouth daily    Pure hypercholesterolemia, High coronary artery calcium score       loratadine 10 MG tablet    CLARITIN     Take 10 mg by mouth as needed for allergies        * PRESERVISION AREDS 2+MULTI VIT Caps      Dose is unknown but takes 1 pill a day        * Multi-vitamin Tabs tablet     100 tablet    Take 1 tablet by mouth daily        * Notice:  This list has 2 medication(s) that are the same as other medications prescribed for you. Read the directions carefully, and ask your doctor or other care provider to review them with you.

## 2017-11-22 NOTE — LETTER
11/22/2017      Remi Wyman MD  [address] [place] [address]      RE: Rufus Elena       Dear Colleague,    I had the pleasure of seeing Rufus Elena in the [facility].    REASON FOR CLINIC VISIT:  Followup, mildly dilated aortic root, ascending aorta, hypertrophic cardiomyopathy.      HISTORY OF PRESENT ILLNESS:  Mr. Elena is a very pleasant 67-year-old gentleman with history of apical hypertrophic cardiomyopathy diagnosed more than 10 years ago in [place] and mildly dilated aortic root and ascending aorta measuring 3.9 cm.  Today is coming for routine followup.      Repeat echocardiogram shows stable size of aortic root and ascending aorta.  He also has mild nonobstructive coronary artery disease on the basis of coronary CT angiogram that showed 25% of LAD disease with trivial plaque in the left circumflex and right coronary with calcium score of 413, which was 80th percentile for his age.  In terms of sudden cardiac death risk stratification in the setting of hypertrophic cardiomyopathy, the maximal LV thickness is 16 mm.  There are some small patchy delayed enhancement along the left ventricular apex.  No NSVT on Holter evaluation. No personal history of syncope family of sudden cardiac death and normal blood pressure response to exercise.  He also has dyslipidemia with LDL of 135.  He was put on Lipitor 40 mg daily, but then due to some myalgias, he decreased the dose to 20 mg, but his LDL was still elevated and he went back to 40 mg daily.  The latest lipid panel shows LDL of 98.        The patient tells me that overall he feels quite well.  He is fairly active.  He walks at least 2 miles.  No chest discomfort or shortness or with physical activity.  No presyncope, syncope or dizziness.  He still notes some overall generalized body aches over the shoulders and legs.  No tobacco exposure.  To be noted, his son has also been diagnosed with hypertrophic  cardiomyopathy and works as an internist in [place].      PHYSICAL EXAMINATION:   VITAL SIGNS:  Blood pressure 131/84, heart rate 60 regular, weight 214 pounds, BMI 32.7.   GENERAL:  The patient appears pleasant, comfortable.   NECK:  Normal JVP, no bruit.   CARDIOVASCULAR SYSTEM:  S1, S2 normal, no murmur, rub or gallop.   RESPIRATORY SYSTEM:  Clear to auscultation bilaterally.     ABDOMEN:  Soft, nontender.   EXTREMITIES:  No pitting pedal edema.   NEUROLOGICAL:  Alert and oriented x3.   PSYCH:  Normal affect.   SKIN:  No obvious rash.     HEENT:  No pallor, icterus.      ASSESSMENT AND PLAN:  A very pleasant 67-year-old gentleman with apical hypertrophic cardiomyopathy, essentially asymptomatic from it with no major risk features for sudden cardiac death as noted above, no personal history of syncope.  Overall cardiac status-wise he is doing quite well.  He has mildly dilated ascending aorta, aortic root and mild nonobstructive coronary artery disease.  Some of the symptoms are suggestive of statin-induced myalgias and I recommend a statin holiday for 7-10 days and if he recovers completely from the symptoms, he should call our clinic and I would recommend using an alternative statin, like pravastatin.  In case he does not notice significant improvement in symptoms, he should resume Lipitor at the current dose.        Regarding mildly dilated aortic root and ascending aorta, they appear stable in size and we can recheck them in about 2 years' time and I can see him in the clinic at that time, sooner if any change in clinical status, especially if any exertionally related symptoms.       Outpatient Encounter Prescriptions as of 11/22/2017   Medication Sig Dispense Refill     Multiple Vitamins-Minerals (PRESERVISION AREDS 2+MULTI VIT) CAPS Dose is unknown but takes 1 pill a day       atorvastatin (LIPITOR) 40 MG tablet Take 1 tablet (40 mg) by mouth daily 90 tablet 3     multivitamin, therapeutic with minerals  (MULTI-VITAMIN) TABS tablet Take 1 tablet by mouth daily 100 tablet 3     aspirin 81 MG EC tablet Take 1 tablet (81 mg) by mouth daily 90 tablet 3     loratadine (CLARITIN) 10 MG tablet Take 10 mg by mouth as needed for allergies       No facility-administered encounter medications on file as of 11/22/2017.        Again, thank you for allowing me to participate in the care of your patient.      Sincerely,    Cole Burciaga MD     [facility]

## 2018-03-04 ENCOUNTER — HEALTH MAINTENANCE LETTER (OUTPATIENT)
Age: 68
End: 2018-03-04

## 2018-03-21 ENCOUNTER — OFFICE VISIT (OUTPATIENT)
Dept: UROLOGY | Facility: CLINIC | Age: 68
End: 2018-03-21
Payer: COMMERCIAL

## 2018-03-21 VITALS — WEIGHT: 214 LBS | BODY MASS INDEX: 32.43 KG/M2 | OXYGEN SATURATION: 97 % | HEART RATE: 56 BPM | HEIGHT: 68 IN

## 2018-03-21 DIAGNOSIS — N40.0 ENLARGED PROSTATE: ICD-10-CM

## 2018-03-21 DIAGNOSIS — N40.0 ENLARGED PROSTATE: Primary | ICD-10-CM

## 2018-03-21 PROCEDURE — 99213 OFFICE O/P EST LOW 20 MIN: CPT | Performed by: UROLOGY

## 2018-03-21 PROCEDURE — 84153 ASSAY OF PSA TOTAL: CPT | Performed by: UROLOGY

## 2018-03-21 PROCEDURE — 36415 COLL VENOUS BLD VENIPUNCTURE: CPT | Performed by: UROLOGY

## 2018-03-21 ASSESSMENT — PAIN SCALES - GENERAL: PAINLEVEL: NO PAIN (0)

## 2018-03-21 NOTE — PROGRESS NOTES
"Office Visit Note  [facility]  [phone]    UROLOGIC DIAGNOSES:   Enlarged prostate and history of incomplete bladder emptying    CURRENT INTERVENTIONS:   Laser TURP in 2016    HISTORY:   Rufus returns to clinic today for follow-up on enlarged prostate. He reports feeling well. He has a good urinary stream and no urinary complaints. He has not yet had a PSA rechecked recently.      PAST MEDICAL HISTORY:   Past Medical History:   Diagnosis Date     Benign non-nodular prostatic hyperplasia with lower urinary tract symptoms 11/9/2015     Hypertrophic cardiomyopathy (H)      Uncomplicated asthma     as child       PAST SURGICAL HISTORY:   Past Surgical History:   Procedure Laterality Date     LASER REVOLIX PHOTOSELECTIVE VAPORIZATION PROSTATE N/A 12/13/2016    Procedure: LASER REVOLIX / QUANTA PHOTOSELECTIVE VAPORIZATION PROSTATE;  Surgeon: Ryna Ruiz MD;  Location: [facility] OR     ORTHOPEDIC SURGERY      knee scopes bilateral     PROSTATE SURGERY         FAMILY HISTORY:   Family History   Problem Relation Age of Onset     CANCER Mother      Pancreatic     CANCER Father      Bladder       SOCIAL HISTORY:   Social History   Substance Use Topics     Smoking status: Never Smoker     Smokeless tobacco: Never Used     Alcohol use Yes      Comment: 15-20 beers per week       Current Outpatient Prescriptions   Medication     Multiple Vitamins-Minerals (PRESERVISION AREDS 2+MULTI VIT) CAPS     atorvastatin (LIPITOR) 40 MG tablet     multivitamin, therapeutic with minerals (MULTI-VITAMIN) TABS tablet     aspirin 81 MG EC tablet     loratadine (CLARITIN) 10 MG tablet     No current facility-administered medications for this visit.          PHYSICAL EXAM:    Pulse 56  Ht 1.727 m (5' 8\")  Wt 97.1 kg (214 lb)  SpO2 97%  BMI 32.54 kg/m2    HEENT: Normocephalic and atraumatic   Cardiac: Not done  Back/Flank: Not done  CNS/PNS: Not done  Respiratory: Normal non-labored breathing  Abdomen: Soft nontender " and nondistended  Peripheral Vascular: Not done  Mental Status: Not done    Penis: Not done  Scrotal Skin: Not done  Testicles: Not done  Epididymis: Not done  Digital Rectal Exam:     Cystoscopy: Not done    Imaging: None    Urinalysis: UA RESULTS:  Recent Labs   Lab Test  01/30/17   0918   COLOR  Yellow   APPEARANCE  Clear   URINEGLC  Negative   URINEBILI  Negative   URINEKETONE  Negative   SG  >1.030   UBLD  Large*   URINEPH  5.5   PROTEIN  100*   UROBILINOGEN  0.2   NITRITE  Negative   LEUKEST  Small*       PSA:     Post Void Residual:     Other labs: None today      IMPRESSION:  Doing well    PLAN:  He is doing very well with no urinary complaints. He does need to have his PSA checked. He will have this done at the lab today and we will contact him with results. If the PSA today is normal he will be a little follow-up with his primary care provider from now on for annual PSA checks    Total Time: 10 minutes                                      Total in Consultation: 10 minutes      Ryan Ruiz M.D.

## 2018-03-21 NOTE — LETTER
3/21/2018       RE: Rufus Elena  [address]  [facility] [facility] [address]     Dear Colleague,    Thank you for referring your patient, Rufus Elena, to the [facility] [place] [facility]. Please see a copy of my visit note below.    Office Visit Note  [facility]  [phone]    UROLOGIC DIAGNOSES:   Enlarged prostate and history of incomplete bladder emptying    CURRENT INTERVENTIONS:   Laser TURP in 2016    HISTORY:   Rufus returns to clinic today for follow-up on enlarged prostate. He reports feeling well. He has a good urinary stream and no urinary complaints. He has not yet had a PSA rechecked recently.      PAST MEDICAL HISTORY:   Past Medical History:   Diagnosis Date     Benign non-nodular prostatic hyperplasia with lower urinary tract symptoms 11/9/2015     Hypertrophic cardiomyopathy (H)      Uncomplicated asthma     as child       PAST SURGICAL HISTORY:   Past Surgical History:   Procedure Laterality Date     LASER REVOLIX PHOTOSELECTIVE VAPORIZATION PROSTATE N/A 12/13/2016    Procedure: LASER REVOLIX / QUANTA PHOTOSELECTIVE VAPORIZATION PROSTATE;  Surgeon: Ryan Ruiz MD;  Location: [facility] OR     ORTHOPEDIC SURGERY      knee scopes bilateral     PROSTATE SURGERY         FAMILY HISTORY:   Family History   Problem Relation Age of Onset     CANCER Mother      Pancreatic     CANCER Father      Bladder       SOCIAL HISTORY:   Social History   Substance Use Topics     Smoking status: Never Smoker     Smokeless tobacco: Never Used     Alcohol use Yes      Comment: 15-20 beers per week       Current Outpatient Prescriptions   Medication     Multiple Vitamins-Minerals (PRESERVISION AREDS 2+MULTI VIT) CAPS     atorvastatin (LIPITOR) 40 MG tablet     multivitamin, therapeutic with minerals (MULTI-VITAMIN) TABS tablet     aspirin 81 MG EC tablet     loratadine (CLARITIN) 10 MG tablet     No current  "facility-administered medications for this visit.          PHYSICAL EXAM:    Pulse 56  Ht 1.727 m (5' 8\")  Wt 97.1 kg (214 lb)  SpO2 97%  BMI 32.54 kg/m2    HEENT: Normocephalic and atraumatic   Cardiac: Not done  Back/Flank: Not done  CNS/PNS: Not done  Respiratory: Normal non-labored breathing  Abdomen: Soft nontender and nondistended  Peripheral Vascular: Not done  Mental Status: Not done    Penis: Not done  Scrotal Skin: Not done  Testicles: Not done  Epididymis: Not done  Digital Rectal Exam:     Cystoscopy: Not done    Imaging: None    Urinalysis: UA RESULTS:  Recent Labs   Lab Test  01/30/17   0918   COLOR  Yellow   APPEARANCE  Clear   URINEGLC  Negative   URINEBILI  Negative   URINEKETONE  Negative   SG  >1.030   UBLD  Large*   URINEPH  5.5   PROTEIN  100*   UROBILINOGEN  0.2   NITRITE  Negative   LEUKEST  Small*       PSA:     Post Void Residual:     Other labs: None today      IMPRESSION:  Doing well    PLAN:  He is doing very well with no urinary complaints. He does need to have his PSA checked. He will have this done at the lab today and we will contact him with results. If the PSA today is normal he will be a little follow-up with his primary care provider from now on for annual PSA checks    Total Time: 10 minutes                                      Total in Consultation: 10 minutes        Again, thank you for allowing me to participate in the care of your patient.      Sincerely,    Ryan Ruiz MD      "

## 2018-03-21 NOTE — MR AVS SNAPSHOT
After Visit Summary   3/21/2018    Rufus Elena    MRN: [id number]           Patient Information     Date Of Birth          1950        Visit Information        Provider Department      3/21/2018 9:00 AM Ryan Ruiz MD [facility]        Today's Diagnoses     Enlarged prostate    -  1       Follow-ups after your visit        Follow-up notes from your care team     Return if symptoms worsen or fail to improve, for PSA today, I will call with results.      Your next 10 appointments already scheduled     Mar 21, 2018  9:00 AM CDT   Return Visit with Ryan Ruiz MD   [facility] ([facility])    [address]  [address]  [facility] [address] [phone]              Future tests that were ordered for you today     Open Future Orders        Priority Expected Expires Ordered    PSA tumor marker Routine  3/21/2019 3/21/2018            Who to contact     If you have questions or need follow up information about today's clinic visit or your schedule please contact [facility] directly at [phone].  Normal or non-critical lab and imaging results will be communicated to you by Blottrhart, letter or phone within 4 business days after the clinic has received the results. If you do not hear from us within 7 days, please contact the clinic through Blottrhart or phone. If you have a critical or abnormal lab result, we will notify you by phone as soon as possible.  Submit refill requests through LootWorks or call your pharmacy and they will forward the refill request to us. Please allow 3 business days for your refill to be completed.          Additional Information About Your Visit        Blottrhart Information     LootWorks gives you secure access to your electronic health record. If you see a primary care provider, you can also send  "messages to your care team and make appointments. If you have questions, please call your primary care clinic.  If you do not have a primary care provider, please call [phone] and they will assist you.        Care EveryWhere ID     This is your Care EveryWhere ID. This could be used by other organizations to access your [facility] medical records  [id number]        Your Vitals Were     Pulse Height Pulse Oximetry BMI (Body Mass Index)          56 1.727 m (5' 8\") 97% 32.54 kg/m2         Blood Pressure from Last 3 Encounters:   11/22/17 131/84   01/18/17 128/70   12/14/16 147/74    Weight from Last 3 Encounters:   03/21/18 97.1 kg (214 lb)   11/22/17 97.3 kg (214 lb 9.6 oz)   01/30/17 95.3 kg (210 lb)               Primary Care Provider Office Phone # Fax #    Remi Wyman [phone] [address] [place] [address]        Equal Access to Services     [facility]: Hadii aad ku hadasho Soomaali, waaxda luqadaha, qaybta kaalmada adeegyada, waxay nicholein hayjob jimenez . So [facility] [phone].    ATENCIÓN: Si habla español, tiene a proctor disposición servicios gratuitos de asistencia lingüística. LlSelect [facility] [phone].    We comply with applicable federal civil rights laws and [place] laws. We do not discriminate on the basis of race, color, national origin, age, disability, sex, sexual orientation, or gender identity.            Thank you!     Thank you for choosing [facility]IC [place]  for your care. Our goal is always to provide you with excellent care. Hearing back from our patients is one way we can continue to improve our services. Please take a few minutes to complete the written survey that you may receive in the mail after your visit with us. Thank you!             Your Updated Medication List - Protect others around you: Learn how to safely use, store and throw away your medicines at www.disposemymeds.org.        "   This list is accurate as of 3/21/18  8:59 AM.  Always use your most recent med list.                   Brand Name Dispense Instructions for use Diagnosis    aspirin 81 MG EC tablet     90 tablet    Take 1 tablet (81 mg) by mouth daily        atorvastatin 40 MG tablet    LIPITOR    90 tablet    Take 1 tablet (40 mg) by mouth daily    Pure hypercholesterolemia, High coronary artery calcium score       loratadine 10 MG tablet    CLARITIN     Take 10 mg by mouth as needed for allergies        * PRESERVISION AREDS 2+MULTI VIT Caps      Dose is unknown but takes 1 pill a day        * Multi-vitamin Tabs tablet     100 tablet    Take 1 tablet by mouth daily        * Notice:  This list has 2 medication(s) that are the same as other medications prescribed for you. Read the directions carefully, and ask your doctor or other care provider to review them with you.

## 2018-03-22 LAB — PSA SERPL-MCNC: 1.92 UG/L (ref 0–4)

## 2018-11-07 DIAGNOSIS — E78.00 PURE HYPERCHOLESTEROLEMIA: ICD-10-CM

## 2018-11-07 DIAGNOSIS — R93.1 HIGH CORONARY ARTERY CALCIUM SCORE: ICD-10-CM

## 2018-11-07 RX ORDER — ATORVASTATIN CALCIUM 40 MG/1
40 TABLET, FILM COATED ORAL DAILY
Qty: 90 TABLET | Refills: 3 | Status: SHIPPED | OUTPATIENT
Start: 2018-11-07 | End: 2019-09-26

## 2018-12-11 ENCOUNTER — OFFICE VISIT (OUTPATIENT)
Dept: PEDIATRICS | Facility: CLINIC | Age: 68
End: 2018-12-11
Payer: COMMERCIAL

## 2018-12-11 VITALS
HEIGHT: 69 IN | WEIGHT: 202.1 LBS | DIASTOLIC BLOOD PRESSURE: 72 MMHG | HEART RATE: 62 BPM | TEMPERATURE: 98.9 F | SYSTOLIC BLOOD PRESSURE: 132 MMHG | RESPIRATION RATE: 16 BRPM | BODY MASS INDEX: 29.93 KG/M2

## 2018-12-11 DIAGNOSIS — I42.2 APICAL VARIANT HYPERTROPHIC CARDIOMYOPATHY (H): ICD-10-CM

## 2018-12-11 DIAGNOSIS — N40.0 ENLARGED PROSTATE: ICD-10-CM

## 2018-12-11 DIAGNOSIS — N52.33 ERECTILE DYSFUNCTION FOLLOWING URETHRAL SURGERY: ICD-10-CM

## 2018-12-11 DIAGNOSIS — Z00.00 MEDICARE ANNUAL WELLNESS VISIT, SUBSEQUENT: Primary | ICD-10-CM

## 2018-12-11 DIAGNOSIS — E78.5 HYPERLIPIDEMIA WITH TARGET LDL LESS THAN 100: ICD-10-CM

## 2018-12-11 DIAGNOSIS — Z23 NEED FOR PROPHYLACTIC VACCINATION AND INOCULATION AGAINST INFLUENZA: ICD-10-CM

## 2018-12-11 PROCEDURE — 99213 OFFICE O/P EST LOW 20 MIN: CPT | Mod: 25 | Performed by: INTERNAL MEDICINE

## 2018-12-11 PROCEDURE — 90662 IIV NO PRSV INCREASED AG IM: CPT | Performed by: INTERNAL MEDICINE

## 2018-12-11 PROCEDURE — 90471 IMMUNIZATION ADMIN: CPT | Performed by: INTERNAL MEDICINE

## 2018-12-11 PROCEDURE — 99397 PER PM REEVAL EST PAT 65+ YR: CPT | Mod: 25 | Performed by: INTERNAL MEDICINE

## 2018-12-11 RX ORDER — SILDENAFIL 100 MG/1
50-100 TABLET, FILM COATED ORAL DAILY PRN
Qty: 4 TABLET | Refills: 3 | Status: SHIPPED | OUTPATIENT
Start: 2018-12-11 | End: 2020-01-10

## 2018-12-11 ASSESSMENT — ENCOUNTER SYMPTOMS
JOINT SWELLING: 0
PARESTHESIAS: 0
HEADACHES: 0
NAUSEA: 0
NERVOUS/ANXIOUS: 0
DYSURIA: 0
WEAKNESS: 0
EYE PAIN: 0
HEMATURIA: 0
PALPITATIONS: 0
ARTHRALGIAS: 0
HEARTBURN: 1
SORE THROAT: 0
FREQUENCY: 0
HEMATOCHEZIA: 0
ABDOMINAL PAIN: 0
SHORTNESS OF BREATH: 0
CHILLS: 0
MYALGIAS: 0
COUGH: 0

## 2018-12-11 ASSESSMENT — MIFFLIN-ST. JEOR: SCORE: 1691.06

## 2018-12-11 ASSESSMENT — ACTIVITIES OF DAILY LIVING (ADL): CURRENT_FUNCTION: NO ASSISTANCE NEEDED

## 2018-12-11 NOTE — PATIENT INSTRUCTIONS
Preventive Health Recommendations:     See your health care provider every year to    Review health changes.     Discuss preventive care.      Review your medicines if your doctor has prescribed any.    Talk with your health care provider about whether you should have a test to screen for prostate cancer (PSA).    Every 3 years, have a diabetes test (fasting glucose). If you are at risk for diabetes, you should have this test more often.    Every 5 years, have a cholesterol test. Have this test more often if you are at risk for high cholesterol or heart disease.     Every 10 years, have a colonoscopy. Or, have a yearly FIT test (stool test). These exams will check for colon cancer.    Talk to with your health care provider about screening for Abdominal Aortic Aneurysm if you have a family history of AAA or have a history of smoking.  Shots:     Get a flu shot each year.     Get a tetanus shot every 10 years.     Talk to your doctor about your pneumonia vaccines. There are now two you should receive - Pneumovax (PPSV 23) and Prevnar (PCV 13).    Talk to your pharmacist about a shingles vaccine.     Talk to your doctor about the hepatitis B vaccine.  Nutrition:     Eat at least 5 servings of fruits and vegetables each day.     Eat whole-grain bread, whole-wheat pasta and brown rice instead of white grains and rice.     Get adequate Calcium and Vitamin D.   Lifestyle    Exercise for at least 150 minutes a week (30 minutes a day, 5 days a week). This will help you control your weight and prevent disease.     Limit alcohol to one drink per day.     No smoking.     Wear sunscreen to prevent skin cancer.     See your dentist every six months for an exam and cleaning.     See your eye doctor every 1 to 2 years to screen for conditions such as glaucoma, macular degeneration and cataracts.    Personalized Prevention Plan  You are due for the preventive services outlined below.  Your care team is available to assist you in  scheduling these services.  If you have already completed any of these items, please share that information with your care team to update in your medical record.    Health Maintenance Due   Topic Date Due     Diptheria Tetanus Pertussis (DTAP/TDAP/TD) Vaccine (1 - Tdap) 01/11/1959     Hepatitis C Screening  01/11/1970     Zoster (Chicken Pox) Vaccine (1 of 2) 01/11/2002     FALL RISK ASSESSMENT  01/11/2017     Pneumococcal Vaccine (1 of 2 - PCV13) 01/11/2017     AORTIC ANEURYSM SCREENING (SYSTEM ASSIGNED)  01/11/2017     Discuss Advance Directive Planning  07/02/2017     Depression Assessment 2 - yearly  08/22/2017     Colonoscopy - every 5 years  01/03/2018     Flu Vaccine (1) 09/01/2018           Patient Education     Sildenafil tablets (Erectile Dysfunction)  Brand Name: Viagra  What is this medicine?  SILDENAFIL (reese DEN a nely) is used to treat erection problems in men.  How should I use this medicine?  Take this medicine by mouth with a glass of water. Follow the directions on the prescription label. The dose is usually taken 1 hour before sexual activity. You should not take the dose more than once per day. Do not take your medicine more often than directed.  Talk to your pediatrician regarding the use of this medicine in children. This medicine is not used in children for this condition.  What side effects may I notice from receiving this medicine?  Side effects that you should report to your doctor or health care professional as soon as possible:    allergic reactions like skin rash, itching or hives, swelling of the face, lips, or tongue    breathing problems    changes in hearing    changes in vision    chest pain    fast, irregular heartbeat    prolonged or painful erection    seizures  Side effects that usually do not require medical attention (report to your doctor or health care professional if they continue or are bothersome):    back pain    dizziness    flushing    headache    indigestion    muscle  aches    nausea    stuffy or runny nose  What may interact with this medicine?  Do not take this medicine with any of the following medications:    cisapride    nitrates like amyl nitrite, isosorbide dinitrate, isosorbide mononitrate, nitroglycerin    riociguat  This medicine may also interact with the following medications:    antiviral medicines for HIV or AIDS    bosentan    certain medicines for benign prostatic hyperplasia (BPH)    certain medicines for blood pressure    certain medicines for fungal infections like ketoconazole and itraconazole    cimetidine    erythromycin    rifampin  What if I miss a dose?  This does not apply. Do not take double or extra doses.  Where should I keep my medicine?  Keep out of reach of children.  Store at room temperature between 15 and 30 degrees C (59 and 86 degrees F). Throw away any unused medicine after the expiration date.  What should I tell my health care provider before I take this medicine?  They need to know if you have any of these conditions:    bleeding disorders    eye or vision problems, including a rare inherited eye disease called retinitis pigmentosa    anatomical deformation of the penis, Peyronie's disease, or history of priapism (painful and prolonged erection)    heart disease, angina, a history of heart attack, irregular heart beats, or other heart problems    high or low blood pressure    history of blood diseases, like sickle cell anemia or leukemia    history of stomach bleeding    kidney disease    liver disease    stroke    an unusual or allergic reaction to sildenafil, other medicines, foods, dyes, or preservatives    pregnant or trying to get pregnant    breast-feeding  What should I watch for while using this medicine?  If you notice any changes in your vision while taking this drug, call your doctor or health care professional as soon as possible. Stop using this medicine and call your health care provider right away if you have a loss of sight  in one or both eyes.  Contact your doctor or health care professional right away if you have an erection that lasts longer than 4 hours or if it becomes painful. This may be a sign of a serious problem and must be treated right away to prevent permanent damage.  If you experience symptoms of nausea, dizziness, chest pain or arm pain upon initiation of sexual activity after taking this medicine, you should refrain from further activity and call your doctor or health care professional as soon as possible.  Do not drink alcohol to excess (examples, 5 glasses of wine or 5 shots of whiskey) when taking this medicine. When taken in excess, alcohol can increase your chances of getting a headache or getting dizzy, increasing your heart rate or lowering your blood pressure.  Using this medicine does not protect you or your partner against HIV infection (the virus that causes AIDS) or other sexually transmitted diseases.  NOTE:This sheet is a summary. It may not cover all possible information. If you have questions about this medicine, talk to your doctor, pharmacist, or health care provider. Copyright  2018 [employer]

## 2018-12-11 NOTE — PROGRESS NOTES
"SUBJECTIVE:   Rufus Elena is a 68 year old male who presents for Preventive Visit.      Are you in the first 12 months of your Medicare coverage?  Yes,  Visual Acuity:  Right Eye: 10/20   Left Eye: 10/40  Both Eyes: 10/20. Pt wears  Contact lens    Annual Wellness Visit     In general, how would you rate your overall health?  Good    Frequency of exercise:  6-7 days/week    Duration of exercise:  Greater than 60 minutes    Do you usually eat at least 4 servings of fruit and vegetables a day, include whole grains    & fiber and avoid regularly eating high fat or \"junk\" foods?  Yes    Taking medications regularly:  Yes    Ability to successfully perform activities of daily living:  No assistance needed    Home Safety:  No safety concerns identified    Hearing Impairment:  Difficulty following a conversation in a noisy restaurant or crowded room    In the past 6 months, have you been bothered by leaking of urine?  No    In general, how would you rate your overall mental or emotional health?  Excellent    PHQ-2 Total Score: 0    Additional concerns today:  No    Do you feel safe in your environment? Yes    Do you have a Health Care Directive? No: Advance care planning was reviewed with patient; patient declined at this time.      Fall risk       Cognitive Screening   1) Repeat 3 items (Leader, Season, Table)    2) Clock draw: NORMAL  3) 3 item recall: Recalls 3 objects  Results: 3 items recalled: COGNITIVE IMPAIRMENT LESS LIKELY    Mini-CogTM Copyright ADELA Benton. Licensed by the author for use in [facility]; reprinted with permission (al@.[facility]). All rights reserved.      Do you have sleep apnea, excessive snoring or daytime drowsiness?: no    Reviewed and updated as needed this visit by clinical staff  Allergies         Reviewed and updated as needed this visit by Provider        Social History     Tobacco Use     Smoking status: Never Smoker     Smokeless tobacco: Never Used   Substance Use Topics "     Alcohol use: Yes     Comment: 15-20 beers per week       Alcohol Use 12/11/2018   If you drink alcohol do you typically have greater than 3 drinks per day OR greater than 7 drinks per week? Yes   No flowsheet data found.        Genitourinary symptoms      Duration: sicne prostate procesure    Description:  erectyle dysfinction    Intensity:  mild    Accompanying signs and symptoms (fever/discharge/nausea/vomiting/back or abdominal pain):  None    History (frequent UTI's/kidney stones/prostate problems): None  Sexually active: YES    Precipitating or alleviating factors: None    Therapies tried and outcome: none    Patient oule like to discuss trying viagra no sx DTds. no pain. no other symptoms.     Current providers sharing in care for this patient include:   Patient Care Team:  Remi Wyman MD as PCP - General    The following health maintenance items are reviewed in Epic and correct as of today:  Health Maintenance   Topic Date Due     DTAP/TDAP/TD IMMUNIZATION (1 - Tdap) 01/11/1959     HEPATITIS C SCREENING  01/11/1970     ZOSTER IMMUNIZATION (1 of 2) 01/11/2002     FALL RISK ASSESSMENT  01/11/2017     PNEUMOVAX IMMUNIZATION 65+ LOW/MEDIUM RISK (1 of 2 - PCV13) 01/11/2017     AORTIC ANEURYSM SCREENING (SYSTEM ASSIGNED)  01/11/2017     ADVANCE DIRECTIVE PLANNING Q5 YRS  07/02/2017     PHQ-2 Q1 YR  08/22/2017     COLONOSCOPY Q5 YR  01/03/2018     INFLUENZA VACCINE (1) 09/01/2018     LIPID SCREEN Q5 YR MALE (SYSTEM ASSIGNED)  06/09/2022     IPV IMMUNIZATION  Aged Out     MENINGITIS IMMUNIZATION  Aged Out     BP Readings from Last 3 Encounters:   12/11/18 132/72   11/22/17 131/84   01/18/17 128/70    Wt Readings from Last 3 Encounters:   12/11/18 91.7 kg (202 lb 1.6 oz)   03/21/18 97.1 kg (214 lb)   11/22/17 97.3 kg (214 lb 9.6 oz)                      Review of Systems   Constitutional: Negative for chills.   HENT: Positive for hearing loss. Negative for congestion, ear pain and sore throat.    Eyes:  "Positive for visual disturbance. Negative for pain.   Respiratory: Negative for cough and shortness of breath.    Cardiovascular: Negative for chest pain, palpitations and peripheral edema.   Gastrointestinal: Positive for heartburn. Negative for abdominal pain, hematochezia and nausea.   Genitourinary: Positive for impotence. Negative for discharge, dysuria, frequency, genital sores, hematuria and urgency.   Musculoskeletal: Negative for arthralgias, joint swelling and myalgias.   Skin: Negative for rash.   Neurological: Negative for weakness, headaches and paresthesias.   Psychiatric/Behavioral: Negative for mood changes. The patient is not nervous/anxious.      reviwed above with patient, due for hearing and vision check. heartburn is well managed with as needed over-the-counter medications.     OBJECTIVE:   /72   Pulse 62   Temp 98.9  F (37.2  C) (Oral)   Resp 16   Ht 1.759 m (5' 9.25\")   Wt 91.7 kg (202 lb 1.6 oz)   BMI 29.63 kg/m   Estimated body mass index is 32.54 kg/m  as calculated from the following:    Height as of 3/21/18: 1.727 m (5' 8\").    Weight as of 3/21/18: 97.1 kg (214 lb).  Physical Exam  GENERAL: healthy, alert and no distress  EYES: Eyes grossly normal to inspection, PERRL and conjunctivae and sclerae normal  HENT: ear canals and TM's normal, nose and mouth without ulcers or lesions  NECK: no adenopathy, no asymmetry, masses, or scars and thyroid normal to palpation  RESP: lungs clear to auscultation - no rales, rhonchi or wheezes  CV: regular rate and rhythm, normal S1 S2, no S3 or S4, no murmur, click or rub, no peripheral edema and peripheral pulses strong  ABDOMEN: soft, nontender, no hepatosplenomegaly, no masses and bowel sounds normal  MS: no gross musculoskeletal defects noted, no edema  SKIN: no suspicious lesions or rashes  NEURO: Normal strength and tone, mentation intact and speech normal  PSYCH: mentation appears normal, affect normal/bright    Results for orders " placed or performed in visit on 03/21/18   PSA tumor marker   Result Value Ref Range    PSA 1.92 0 - 4 ug/L         ASSESSMENT / PLAN:     (Z00.00) Medicare annual wellness visit, subsequent  (primary encounter diagnosis)  Comment: d/w pt preventative care measures including seat belt use, bike helmet, moderation of EtOH, avoiding tobacco, avoiding excessive sun exposure/sunscreen, wt management or wt loss if BMI > 30, need to screen for lipid disorders, mood disorders, CAD risk factors, etc. Also discussed accident prevention and future RHM schedule.   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 reviwed cardiologys' reccs, not on nitrites   Plan: **Comprehensive metabolic panel FUTURE anytime,        Lipid panel reflex to direct LDL Fasting          (E78.5) Hyperlipidemia with target LDL less than 100  Comment: discussed with patient (or patient's parents/caregiver) pathophysiology of condition and treatment options.  reviewed labs, medications, diet ,etc.  due to labs  Plan: **Comprehensive metabolic panel FUTURE anytime,        Lipid panel reflex to direct LDL Fasting          (N40.0) Enlarged prostate  Comment: discussed with patient (or patient's parents/caregiver) pathophysiology of condition and treatment options.  reviwed urology's reccs.   Plan: **Comprehensive metabolic panel FUTURE anytime,        Prostate spec antigen screen          (N52.33) Erectile dysfunction following urethral surgery  Comment: d/w pt pathophysiology of ED and treatment modalitis. The patient desires Viagra to treat his erectile dysfunction. History and physical exam has not disclosed any obvious treatable cause of this complaint. He is informed that Viagra is usually not covered by insurance. It is available on a  "fee-for-service cost basis, and is relatively expensive. He can start with 50 mg dose, and increase to 100 mg if necessary. The method of use 1 hour prior to anticipated sexual activity is explained. He should not use any more than one tablet in a 24 hour period. The side effects of possible headache, flushing, dyspepsia and transient changes in vision have been explained.   The patient is not taking nitrates, and denies he has access to nitrates in any form at any time. I have counseled him that taking Viagra with nitrates of any form can cause death.   We have also discussed the fact that there have been some deaths in patients after taking Viagra, felt due to the exertion of intercourse rather than the drug itself. The patient is aware of this, and accepts whatever unknown degree of risk there is in this aspect.   Plan: sildenafil (VIAGRA) 100 MG tablet          (Z23) Need for prophylactic vaccination and inoculation against influenza  Plan: FLU VACCINE, INCREASED ANTIGEN, PRESV FREE, AGE        65+ [35117], Vaccine Administration, Initial         [51028]      End of Life Planning:  Patient currently has an advanced directive: Yes.  Practitioner is supportive of decision.    COUNSELING:  Reviewed preventive health counseling, as reflected in patient instructions    BP Readings from Last 1 Encounters:   11/22/17 131/84     Estimated body mass index is 32.54 kg/m  as calculated from the following:    Height as of 3/21/18: 1.727 m (5' 8\").    Weight as of 3/21/18: 97.1 kg (214 lb).           reports that  has never smoked. he has never used smokeless tobacco.      Appropriate preventive services were discussed with this patient, including applicable screening as appropriate for cardiovascular disease, diabetes, osteopenia/osteoporosis, and glaucoma.  As appropriate for age/gender, discussed screening for colorectal cancer, prostate cancer, breast cancer, and cervical cancer. Checklist reviewing preventive services " available has been given to the patient.    Reviewed patients plan of care and provided an AVS. The Basic Care Plan (routine screening as documented in Health Maintenance) for Rufus meets the Care Plan requirement. This Care Plan has been established and reviewed with the Patient.    Counseling Resources:  ATP IV Guidelines  Pooled Cohorts Equation Calculator  Breast Cancer Risk Calculator  FRAX Risk Assessment  ICSI Preventive Guidelines  Dietary Guidelines for Americans, 2010  USDA's MyPlate  ASA Prophylaxis  Lung CA Screening    I have discussed with patient the risks, benefits, medications, treatment options and modalities.   I have instructed the patient to call or schedule a follow-up appointment if any problems or failure to improve.       Remi Wyman MD  [facility]      Injectable Influenza Immunization Documentation    1.  Is the person to be vaccinated sick today?   No    2. Does the person to be vaccinated have an allergy to a component   of the vaccine?   No  Egg Allergy Algorithm Link    3. Has the person to be vaccinated ever had a serious reaction   to influenza vaccine in the past?   No    4. Has the person to be vaccinated ever had Guillain-Barré syndrome?   No    Form completed by Pt

## 2018-12-18 DIAGNOSIS — Z00.00 MEDICARE ANNUAL WELLNESS VISIT, SUBSEQUENT: ICD-10-CM

## 2018-12-18 DIAGNOSIS — N40.0 ENLARGED PROSTATE: ICD-10-CM

## 2018-12-18 DIAGNOSIS — E78.5 HYPERLIPIDEMIA WITH TARGET LDL LESS THAN 100: ICD-10-CM

## 2018-12-18 DIAGNOSIS — I42.2 APICAL VARIANT HYPERTROPHIC CARDIOMYOPATHY (H): ICD-10-CM

## 2018-12-18 LAB
ALBUMIN SERPL-MCNC: 3.5 G/DL (ref 3.4–5)
ALP SERPL-CCNC: 60 U/L (ref 40–150)
ALT SERPL W P-5'-P-CCNC: 29 U/L (ref 0–70)
ANION GAP SERPL CALCULATED.3IONS-SCNC: 4 MMOL/L (ref 3–14)
AST SERPL W P-5'-P-CCNC: 30 U/L (ref 0–45)
BILIRUB SERPL-MCNC: 0.6 MG/DL (ref 0.2–1.3)
BUN SERPL-MCNC: 19 MG/DL (ref 7–30)
CALCIUM SERPL-MCNC: 8.8 MG/DL (ref 8.5–10.1)
CHLORIDE SERPL-SCNC: 108 MMOL/L (ref 94–109)
CHOLEST SERPL-MCNC: 161 MG/DL
CO2 SERPL-SCNC: 27 MMOL/L (ref 20–32)
CREAT SERPL-MCNC: 1.03 MG/DL (ref 0.66–1.25)
GFR SERPL CREATININE-BSD FRML MDRD: 72 ML/MIN/1.7M2
GLUCOSE SERPL-MCNC: 103 MG/DL (ref 70–99)
HDLC SERPL-MCNC: 68 MG/DL
LDLC SERPL CALC-MCNC: 83 MG/DL
NONHDLC SERPL-MCNC: 93 MG/DL
POTASSIUM SERPL-SCNC: 4.3 MMOL/L (ref 3.4–5.3)
PROT SERPL-MCNC: 6.5 G/DL (ref 6.8–8.8)
PSA SERPL-ACNC: 2.11 UG/L (ref 0–4)
SODIUM SERPL-SCNC: 139 MMOL/L (ref 133–144)
TRIGL SERPL-MCNC: 48 MG/DL

## 2018-12-18 PROCEDURE — 80061 LIPID PANEL: CPT | Performed by: INTERNAL MEDICINE

## 2018-12-18 PROCEDURE — G0103 PSA SCREENING: HCPCS | Performed by: INTERNAL MEDICINE

## 2018-12-18 PROCEDURE — 80053 COMPREHEN METABOLIC PANEL: CPT | Performed by: INTERNAL MEDICINE

## 2018-12-18 PROCEDURE — 36415 COLL VENOUS BLD VENIPUNCTURE: CPT | Performed by: INTERNAL MEDICINE

## 2019-09-26 DIAGNOSIS — R93.1 HIGH CORONARY ARTERY CALCIUM SCORE: ICD-10-CM

## 2019-09-26 DIAGNOSIS — E78.00 PURE HYPERCHOLESTEROLEMIA: ICD-10-CM

## 2019-09-26 RX ORDER — ATORVASTATIN CALCIUM 40 MG/1
40 TABLET, FILM COATED ORAL DAILY
Qty: 90 TABLET | Refills: 3 | Status: SHIPPED | OUTPATIENT
Start: 2019-09-26 | End: 2020-12-10

## 2019-10-22 ENCOUNTER — ALLIED HEALTH/NURSE VISIT (OUTPATIENT)
Dept: NURSING | Facility: CLINIC | Age: 69
End: 2019-10-22
Payer: MEDICARE

## 2019-10-22 DIAGNOSIS — Z23 ENCOUNTER FOR IMMUNIZATION: Primary | ICD-10-CM

## 2019-10-22 PROCEDURE — G0008 ADMIN INFLUENZA VIRUS VAC: HCPCS | Mod: 59

## 2019-10-22 PROCEDURE — 99207 ZZC NO CHARGE NURSE ONLY: CPT

## 2019-10-22 PROCEDURE — G0009 ADMIN PNEUMOCOCCAL VACCINE: HCPCS | Mod: 59

## 2019-10-22 PROCEDURE — 90714 TD VACC NO PRESV 7 YRS+ IM: CPT

## 2019-10-22 PROCEDURE — 90670 PCV13 VACCINE IM: CPT

## 2019-10-22 PROCEDURE — 90471 IMMUNIZATION ADMIN: CPT

## 2019-10-22 PROCEDURE — 90662 IIV NO PRSV INCREASED AG IM: CPT

## 2019-11-08 ENCOUNTER — HEALTH MAINTENANCE LETTER (OUTPATIENT)
Age: 69
End: 2019-11-08

## 2019-12-31 ENCOUNTER — HOSPITAL ENCOUNTER (OUTPATIENT)
Dept: CARDIOLOGY | Facility: CLINIC | Age: 69
Discharge: HOME | End: 2019-12-31
Attending: INTERNAL MEDICINE | Admitting: INTERNAL MEDICINE
Payer: MEDICARE

## 2019-12-31 DIAGNOSIS — I77.810 AORTIC ROOT DILATATION (H): ICD-10-CM

## 2019-12-31 PROCEDURE — 25500064 ZZH RX 255 OP 636: Performed by: INTERNAL MEDICINE

## 2019-12-31 PROCEDURE — 40000264 ECHOCARDIOGRAM COMPLETE

## 2019-12-31 PROCEDURE — 93306 TTE W/DOPPLER COMPLETE: CPT | Mod: 26 | Performed by: INTERNAL MEDICINE

## 2019-12-31 RX ADMIN — HUMAN ALBUMIN MICROSPHERES AND PERFLUTREN 5 ML: 10; .22 INJECTION, SOLUTION INTRAVENOUS at 10:45

## 2020-01-10 ENCOUNTER — OFFICE VISIT (OUTPATIENT)
Dept: CARDIOLOGY | Facility: CLINIC | Age: 70
End: 2020-01-10
Payer: MEDICARE

## 2020-01-10 VITALS
SYSTOLIC BLOOD PRESSURE: 128 MMHG | BODY MASS INDEX: 31.47 KG/M2 | HEIGHT: 69 IN | HEART RATE: 58 BPM | WEIGHT: 212.5 LBS | DIASTOLIC BLOOD PRESSURE: 84 MMHG

## 2020-01-10 DIAGNOSIS — I42.2 HYPERTROPHIC CARDIOMYOPATHY (H): Primary | ICD-10-CM

## 2020-01-10 PROCEDURE — 99213 OFFICE O/P EST LOW 20 MIN: CPT | Performed by: INTERNAL MEDICINE

## 2020-01-10 ASSESSMENT — MIFFLIN-ST. JEOR: SCORE: 1733.23

## 2020-01-10 NOTE — LETTER
1/10/2020    Remi Wyman MD  [address] [place] [address]    RE: Rufus Ulices       Dear Colleague,    I had the pleasure of seeing Rufus Ulices in the [facility].    HPI and Plan:   See dictation(#[id number])    Orders Placed This Encounter   Procedures     Follow-Up with Cardiologist       No orders of the defined types were placed in this encounter.      Medications Discontinued During This Encounter   Medication Reason     aspirin 81 MG EC tablet Stopped by Patient     multivitamin, therapeutic with minerals (MULTI-VITAMIN) TABS tablet Medication Reconciliation Clean Up     sildenafil (VIAGRA) 100 MG tablet Medication Reconciliation Clean Up         Encounter Diagnosis   Name Primary?     Hypertrophic cardiomyopathy (H) Yes       CURRENT MEDICATIONS:  Current Outpatient Medications   Medication Sig Dispense Refill     atorvastatin (LIPITOR) 40 MG tablet Take 1 tablet (40 mg) by mouth daily 90 tablet 3     loratadine (CLARITIN) 10 MG tablet Take 10 mg by mouth as needed for allergies       Multiple Vitamins-Minerals (PRESERVISION AREDS 2+MULTI VIT) CAPS Dose is unknown but takes 1 pill a day         ALLERGIES     Allergies   Allergen Reactions     Seasonal Allergies        PAST MEDICAL HISTORY:  Past Medical History:   Diagnosis Date     Benign non-nodular prostatic hyperplasia with lower urinary tract symptoms 11/9/2015     Hypertrophic cardiomyopathy (H)      Uncomplicated asthma     as child       PAST SURGICAL HISTORY:  Past Surgical History:   Procedure Laterality Date     LASER REVOLIX PHOTOSELECTIVE VAPORIZATION PROSTATE N/A 12/13/2016    Procedure: LASER REVOLIX / QUANTA PHOTOSELECTIVE VAPORIZATION PROSTATE;  Surgeon: Ryan Ruiz MD;  Location: [facility] OR     ORTHOPEDIC SURGERY      knee scopes bilateral     PROSTATE SURGERY         FAMILY HISTORY:  Family History   Problem Relation Age of Onset     Cancer Mother         Pancreatic     Cancer  Father         Bladder       SOCIAL HISTORY:  Social History     Socioeconomic History     Marital status:      Spouse name: None     Number of children: None     Years of education: None     Highest education level: None   Occupational History     None   Social Needs     Financial resource strain: None     Food insecurity:     Worry: None     Inability: None     Transportation needs:     Medical: None     Non-medical: None   Tobacco Use     Smoking status: Former Smoker     Last attempt to quit:      Years since quittin.0     Smokeless tobacco: Never Used     Tobacco comment: quit at age 21   Substance and Sexual Activity     Alcohol use: Yes     Comment: 15-20 beers per week     Drug use: No     Sexual activity: Yes     Partners: Female   Lifestyle     Physical activity:     Days per week: None     Minutes per session: None     Stress: None   Relationships     Social connections:     Talks on phone: None     Gets together: None     Attends Oriental orthodox service: None     Active member of club or organization: None     Attends meetings of clubs or organizations: None     Relationship status: None     Intimate partner violence:     Fear of current or ex partner: None     Emotionally abused: None     Physically abused: None     Forced sexual activity: None   Other Topics Concern     Parent/sibling w/ CABG, MI or angioplasty before 65F 55M? Not Asked      Service Not Asked     Blood Transfusions Not Asked     Caffeine Concern No     Comment: 3 coffee in the mornings     Occupational Exposure Not Asked     Hobby Hazards Not Asked     Sleep Concern Not Asked     Stress Concern Not Asked     Weight Concern Not Asked     Special Diet Not Asked     Back Care Not Asked     Exercise Yes     Comment: walking/biking- 2 hours a day.     Bike Helmet Not Asked     Seat Belt Not Asked     Self-Exams Not Asked   Social History Narrative     None       Review of Systems:  Skin:  Negative       Eyes:  Positive for  "contacts    ENT:  Negative      Respiratory:  Negative       Cardiovascular:  Negative      Gastroenterology: Negative      Genitourinary:  not assessed      Musculoskeletal:  Positive for joint pain    Neurologic:  Negative      Psychiatric:  Negative      Heme/Lymph/Imm:  Positive for allergies    Endocrine:  Negative        Physical Exam:  Vitals: /84   Pulse 58   Ht 1.759 m (5' 9.25\")   Wt 96.4 kg (212 lb 8 oz)   BMI 31.15 kg/m       Constitutional:           Skin:             Head:           Eyes:           Lymph:      ENT:           Neck:           Respiratory:            Cardiac:                                                           GI:           Extremities and Muscular Skeletal:                 Neurological:           Psych:             CC  No referring provider defined for this encounter.                    Thank you for allowing me to participate in the care of your patient.      Sincerely,     Cole Burciaga MD     [facility]    cc:   No referring provider defined for this encounter.        "

## 2020-01-10 NOTE — PROGRESS NOTES
Service Date: 01/10/2020      REASON FOR CLINIC VISIT:  Followup mildly dilated aortic root, hypertrophic cardiomyopathy, nonobstructive coronary artery disease.      HISTORY OF PRESENT ILLNESS:  ILLNESS:  Mr. Elena is a very pleasant 69-year-old gentleman with history of apical hypertrophy diagnosed more than 11 years ago in [place] with mildly dilated aortic root/ascending aorta measuring 3.9 cm, nonobstructive coronary artery disease on the basis of coronary CT angiogram in 2016.      Today he is coming for routine followup.  He had a repeat echocardiogram that showed stable size of aorta.  In terms of sudden cardiac death risk stratification in the setting of hypertrophic cardiomyopathy, the maximum LV thickness was 1.6 cm.  There was a small patchy delayed enhancement along the left ventricular apex.  No NSVT on Holter.  No personal history of syncope or family history of sudden cardiac death and normal blood pressure response to exercise.  To be noted, the patient is on a statin, high-intensity Lipitor 40.  He was also on baby aspirin but tells me that he stopped taking aspirin when the reports came earlier last year about no indication of aspirin for primary prevention.  He never had any side effects when he was on baby aspirin.  The patient tells me health-wise he feels quite well.  He is fairly active.  He does regular hiking, sometimes biking, a lot of walking, more so now that he is retired and has more time.  No chest discomfort or shortness of breath with physical activity.  No dizziness, presyncope or syncope.  The patient tells me that of late, he has noticed that sometimes his blood pressure recordings at home would be elevated in 150s systolic, diastolic 80s, but other times it would be back in 130s to 120s systolic.  Today in the clinic, his initial systolic blood pressure was elevated to 150s, on repeat check his blood pressure was 128/84.      PHYSICAL EXAMINATION:   VITAL SIGNS:  Blood pressure  128/84, heart rate 58, regular, weight 212 pounds, BMI 31.15.   GENERAL:  The patient appears pleasant, comfortable.   NECK:  Normal JVP, no bruit.   CARDIOVASCULAR SYSTEM:  S1, S2 normal, no murmur, rub or gallop.   RESPIRATORY SYSTEM:  Clear to auscultation bilaterally.   GASTROINTESTINAL SYSTEM:  Abdomen soft, nontender.   EXTREMITIES:  No pitting pedal edema.   NEUROLOGICAL:  Alert, oriented x3.   PSYCH:  Normal affect.   SKIN:  No obvious rash.   HEENT:  No pallor or icterus.      IMPRESSION AND PLAN:  A very pleasant 69-year-old gentleman with history of apical hypertrophic cardiomyopathy, no major sudden cardiac death risk criteria, other comorbidities of nonobstructive coronary artery disease, mildly dilated aortic root.  Overall, cardiac status-wise, he is doing reasonably well.  The aorta size is stable on repeat echocardiogram with normal LV function.  Clinically, he does not appear to have any typical anginal symptoms.  I did discuss with the patient about the indication for aspirin, which in his case will be secondary prophylaxis given that he has evidence of mild nonobstructive coronary artery disease.  The patient tells me that he will resume baby aspirin, as he had no issues with it when he was on it.  He will continue statin.  If he continues to feel stable cardiac status-wise, we can see him back in 1 year, sooner if he notes any change in clinical status.  If patient notices that his blood pressure recordings at home are consistently high, I recommend that patient to follow with his primary care physician, Dr. Remi Wyman, for further evaluation of possible hypertension.      cc:   [place] [address]         JOSE FRANCISCO WEI MD             D: 01/10/2020   T: 01/10/2020   MT: JOSÉ      Name:     TONI SWANN   MRN:      [id number]        Account:      [id number]   :      1950           Service Date:  01/10/2020      Document: [id number]

## 2020-01-10 NOTE — PROGRESS NOTES
HPI and Plan:   See dictation(#[id number])    Orders Placed This Encounter   Procedures     Follow-Up with Cardiologist       No orders of the defined types were placed in this encounter.      Medications Discontinued During This Encounter   Medication Reason     aspirin 81 MG EC tablet Stopped by Patient     multivitamin, therapeutic with minerals (MULTI-VITAMIN) TABS tablet Medication Reconciliation Clean Up     sildenafil (VIAGRA) 100 MG tablet Medication Reconciliation Clean Up         Encounter Diagnosis   Name Primary?     Hypertrophic cardiomyopathy (H) Yes       CURRENT MEDICATIONS:  Current Outpatient Medications   Medication Sig Dispense Refill     atorvastatin (LIPITOR) 40 MG tablet Take 1 tablet (40 mg) by mouth daily 90 tablet 3     loratadine (CLARITIN) 10 MG tablet Take 10 mg by mouth as needed for allergies       Multiple Vitamins-Minerals (PRESERVISION AREDS 2+MULTI VIT) CAPS Dose is unknown but takes 1 pill a day         ALLERGIES     Allergies   Allergen Reactions     Seasonal Allergies        PAST MEDICAL HISTORY:  Past Medical History:   Diagnosis Date     Benign non-nodular prostatic hyperplasia with lower urinary tract symptoms 11/9/2015     Hypertrophic cardiomyopathy (H)      Uncomplicated asthma     as child       PAST SURGICAL HISTORY:  Past Surgical History:   Procedure Laterality Date     LASER REVOLIX PHOTOSELECTIVE VAPORIZATION PROSTATE N/A 12/13/2016    Procedure: LASER REVOLIX / QUANTA PHOTOSELECTIVE VAPORIZATION PROSTATE;  Surgeon: Ryan Ruiz MD;  Location: [facility]     ORTHOPEDIC SURGERY      knee scopes bilateral     PROSTATE SURGERY         FAMILY HISTORY:  Family History   Problem Relation Age of Onset     Cancer Mother         Pancreatic     Cancer Father         Bladder       SOCIAL HISTORY:  Social History     Socioeconomic History     Marital status:      Spouse name: None     Number of children: None     Years of education: None     Highest education level:  None   Occupational History     None   Social Needs     Financial resource strain: None     Food insecurity:     Worry: None     Inability: None     Transportation needs:     Medical: None     Non-medical: None   Tobacco Use     Smoking status: Former Smoker     Last attempt to quit: 1971     Years since quittin.0     Smokeless tobacco: Never Used     Tobacco comment: quit at age 21   Substance and Sexual Activity     Alcohol use: Yes     Comment: 15-20 beers per week     Drug use: No     Sexual activity: Yes     Partners: Female   Lifestyle     Physical activity:     Days per week: None     Minutes per session: None     Stress: None   Relationships     Social connections:     Talks on phone: None     Gets together: None     Attends Pentecostalism service: None     Active member of club or organization: None     Attends meetings of clubs or organizations: None     Relationship status: None     Intimate partner violence:     Fear of current or ex partner: None     Emotionally abused: None     Physically abused: None     Forced sexual activity: None   Other Topics Concern     Parent/sibling w/ CABG, MI or angioplasty before 65F 55M? Not Asked      Service Not Asked     Blood Transfusions Not Asked     Caffeine Concern No     Comment: 3 coffee in the mornings     Occupational Exposure Not Asked     Hobby Hazards Not Asked     Sleep Concern Not Asked     Stress Concern Not Asked     Weight Concern Not Asked     Special Diet Not Asked     Back Care Not Asked     Exercise Yes     Comment: walking/biking- 2 hours a day.     Bike Helmet Not Asked     Seat Belt Not Asked     Self-Exams Not Asked   Social History Narrative     None       Review of Systems:  Skin:  Negative       Eyes:  Positive for contacts    ENT:  Negative      Respiratory:  Negative       Cardiovascular:  Negative      Gastroenterology: Negative      Genitourinary:  not assessed      Musculoskeletal:  Positive for joint pain    Neurologic:   "Negative      Psychiatric:  Negative      Heme/Lymph/Imm:  Positive for allergies    Endocrine:  Negative        Physical Exam:  Vitals: /84   Pulse 58   Ht 1.759 m (5' 9.25\")   Wt 96.4 kg (212 lb 8 oz)   BMI 31.15 kg/m      Constitutional:           Skin:             Head:           Eyes:           Lymph:      ENT:           Neck:           Respiratory:            Cardiac:                                                           GI:           Extremities and Muscular Skeletal:                 Neurological:           Psych:             CC  No referring provider defined for this encounter.                  "

## 2020-02-23 ENCOUNTER — HEALTH MAINTENANCE LETTER (OUTPATIENT)
Age: 70
End: 2020-02-23

## 2020-03-09 ENCOUNTER — OFFICE VISIT (OUTPATIENT)
Dept: PODIATRY | Facility: CLINIC | Age: 70
End: 2020-03-09
Payer: MEDICARE

## 2020-03-09 VITALS
SYSTOLIC BLOOD PRESSURE: 122 MMHG | WEIGHT: 203 LBS | HEIGHT: 69 IN | BODY MASS INDEX: 30.07 KG/M2 | DIASTOLIC BLOOD PRESSURE: 72 MMHG

## 2020-03-09 DIAGNOSIS — B35.1 ONYCHOMYCOSIS: ICD-10-CM

## 2020-03-09 DIAGNOSIS — L60.0 ONYCHOCRYPTOSIS: Primary | ICD-10-CM

## 2020-03-09 DIAGNOSIS — L60.3 DYSTROPHIC NAIL: ICD-10-CM

## 2020-03-09 LAB
ALT SERPL W P-5'-P-CCNC: 26 U/L (ref 0–70)
AST SERPL W P-5'-P-CCNC: 19 U/L (ref 0–45)

## 2020-03-09 PROCEDURE — 11750 EXCISION NAIL&NAIL MATRIX: CPT | Mod: T5 | Performed by: PODIATRIST

## 2020-03-09 PROCEDURE — 99204 OFFICE O/P NEW MOD 45 MIN: CPT | Mod: 25 | Performed by: PODIATRIST

## 2020-03-09 PROCEDURE — 84450 TRANSFERASE (AST) (SGOT): CPT | Performed by: PODIATRIST

## 2020-03-09 PROCEDURE — 36415 COLL VENOUS BLD VENIPUNCTURE: CPT | Performed by: PODIATRIST

## 2020-03-09 PROCEDURE — 84460 ALANINE AMINO (ALT) (SGPT): CPT | Performed by: PODIATRIST

## 2020-03-09 RX ORDER — SILVER SULFADIAZINE 10 MG/G
CREAM TOPICAL
Qty: 50 G | Refills: 0 | Status: SHIPPED | OUTPATIENT
Start: 2020-03-09 | End: 2022-11-17

## 2020-03-09 RX ORDER — TERBINAFINE HYDROCHLORIDE 250 MG/1
250 TABLET ORAL DAILY
Qty: 90 TABLET | Refills: 0 | Status: SHIPPED | OUTPATIENT
Start: 2020-03-09 | End: 2020-06-07

## 2020-03-09 ASSESSMENT — MIFFLIN-ST. JEOR: SCORE: 1685.14

## 2020-03-09 NOTE — PROGRESS NOTES
"Foot & Ankle Surgery  March 9, 2020    CC: \"ingrown toenail + toenail fungus\"    I was asked to see Rufus Elena regarding the chief complaint by:  self    HPI:  Pt is a 70 year old male who presents with above complaint.  20 year history of bilateral hallux nail issues, including ingrown bilateral border R hallux and nail fungus L hallux.  The R hallux nail can be quite painful and can be very limiting activity-wise.      ROS:   Pos for CC.  The patient denies current nausea, vomiting, chills, fevers, belly pain, calf pain, chest pain or SOB.  Complete remainder of ROS is otherwise neg.    VITALS:    Vitals:    03/09/20 0754   BP: 122/72   Weight: 92.1 kg (203 lb)   Height: 1.759 m (5' 9.25\")       PMH:    Past Medical History:   Diagnosis Date     Benign non-nodular prostatic hyperplasia with lower urinary tract symptoms 11/9/2015     Hypertrophic cardiomyopathy (H)      Uncomplicated asthma     as child       SXHX:    Past Surgical History:   Procedure Laterality Date     LASER REVOLIX PHOTOSELECTIVE VAPORIZATION PROSTATE N/A 12/13/2016    Procedure: LASER REVOLIX / QUANTA PHOTOSELECTIVE VAPORIZATION PROSTATE;  Surgeon: Ryan Ruiz MD;  Location: [facility]     ORTHOPEDIC SURGERY      knee scopes bilateral     PROSTATE SURGERY          MEDS:    Current Outpatient Medications   Medication     atorvastatin (LIPITOR) 40 MG tablet     loratadine (CLARITIN) 10 MG tablet     Multiple Vitamins-Minerals (PRESERVISION AREDS 2+MULTI VIT) CAPS     No current facility-administered medications for this visit.        ALL:     Allergies   Allergen Reactions     Seasonal Allergies        FMH:    Family History   Problem Relation Age of Onset     Cancer Mother         Pancreatic     Cancer Father         Bladder       SocHx:    Social History     Socioeconomic History     Marital status:      Spouse name: Not on file     Number of children: Not on file     Years of education: Not on file     Highest education " level: Not on file   Occupational History     Not on file   Social Needs     Financial resource strain: Not on file     Food insecurity     Worry: Not on file     Inability: Not on file     Transportation needs     Medical: Not on file     Non-medical: Not on file   Tobacco Use     Smoking status: Former Smoker     Last attempt to quit: 1971     Years since quittin.2     Smokeless tobacco: Never Used     Tobacco comment: quit at age 21   Substance and Sexual Activity     Alcohol use: Yes     Comment: 15-20 beers per week     Drug use: No     Sexual activity: Yes     Partners: Female   Lifestyle     Physical activity     Days per week: Not on file     Minutes per session: Not on file     Stress: Not on file   Relationships     Social connections     Talks on phone: Not on file     Gets together: Not on file     Attends Islam service: Not on file     Active member of club or organization: Not on file     Attends meetings of clubs or organizations: Not on file     Relationship status: Not on file     Intimate partner violence     Fear of current or ex partner: Not on file     Emotionally abused: Not on file     Physically abused: Not on file     Forced sexual activity: Not on file   Other Topics Concern     Parent/sibling w/ CABG, MI or angioplasty before 65F 55M? Not Asked      Service Not Asked     Blood Transfusions Not Asked     Caffeine Concern No     Comment: 3 coffee in the mornings     Occupational Exposure Not Asked     Hobby Hazards Not Asked     Sleep Concern Not Asked     Stress Concern Not Asked     Weight Concern Not Asked     Special Diet Not Asked     Back Care Not Asked     Exercise Yes     Comment: walking/biking- 2 hours a day.     Bike Helmet Not Asked     Seat Belt Not Asked     Self-Exams Not Asked   Social History Narrative     Not on file           EXAMINATION:  Gen:   No apparent distress  Neuro:   A&Ox3, no deficits  Psych:    Answering questions appropriately for age and  situation with normal affect  Head:    NCAT  Eye:    Visual scanning without deficit  Ear:    Response to auditory stimuli wnl  Lung:    Non-labored breathing on RA noted  Abd:    NTND per patient report  Lymph:    Neg for pitting/non-pitting edema BLE  Vasc:    Pulses palpable, CFT minimally delayed  Neuro:    Light touch sensation intact to all sensory nerve distributions without paresthesias  Derm:    Hallux nails dystrophic/mycotic.   Bilateral border R hallux nail incurvated, small cut(from patient trying to cut nails) without SOI.  Very tender with pressure  MSK:    ROM, strength wnl without limitation, no pain on palpation noted.  Calf:    Neg for redness, swelling or tenderness    Assessment:  70 year old male with onychocryptosis bilateral border R hallux in setting of dystrophic/mycotic hallux nails      Plan:  Discussed etiologies, anatomy and options  1.  Onychocryptosis bilateral border R hallux in setting of dystrophic/mycotic hallux nails  -Regarding the nail, treatment options were discussed.  They elected to proceed with a procedure, Total permanent avulsion.  See procedure note for details.  Risks that were discussed include but are not limited to infection, wound healing complications, nerve irritation, recurrence of the ingrown nail and the need for further procedures.  Antibiotic:  None needed  -rx for silvadene for BID dressings  -ALT/AST today; future ALT/AST monthly x 3 ordered today.  Will call with abnormalities, and will stop medication.  -Rx for Lamisil    After discussing the procedure, as well as risks, complications and post-procedure instructions, informed consent was obtained.    Anesthesia:  6 cc's of  1% lidocaine plain    Procedure:  After adequate prep, and with anesthesia achieved, a tourni-cot was applied to the involved toe(and removed after bandage application) and  attention was directed to the R hallux where the nail plate was freed from surrounding soft tissue and then  removed in total.  The base of the wound was explored and showed no necrotic tissue, purulence or debris.  Phenol was then applied to the base of the wound for 30s x 3, and sufficient isopropyl alcohol was used to irrigate the wound.  The base of the wound/ nail matrix was curetted after each acid application.  A clean dressing was applied loosely to prevent vascular insult.  The patient tolerated the procedure well without complications.    Post-procedural instructions were dispensed and discussed with the patient.  All questions were answered.           Follow up:  2 weeks or sooner with acute issues      Patient's medical history was reviewed today    Body mass index is 29.76 kg/m .  Weight management plan: Patient was referred to their PCP to discuss a diet and exercise plan.        Julien Lino DPM FACFAS FACFAOM  Podiatric Foot & Ankle Surgeon  [facility]  [phone]

## 2020-03-09 NOTE — PATIENT INSTRUCTIONS
Thank you for choosing [facility] Podiatry / Foot & Ankle Surgery!    DR. BARAJAS'S CLINIC LOCATIONS:   MONDAY - [place] TUESDAY AM - [place]   [address] [facility]  [address]   [place] [address] [place] [address]   [phone]       THURSDAY AM - [place] THURSDAY PM - [place]   [address]   [place] [address] [place] [address]   [phone]       FRIDAY AM - [place] SET UP SURGERY: [phone] [address] APPOINTMENTS: [phone]   [place] [address] BILLING QUESTIONS: [phone] FAX NUMBER: [phone]     INGROWN TOENAIL REMOVAL AFTERCARE  1. After the procedure, go home and elevate the foot/feet for the remainder of the day/evening as able. This is to minimize swelling, control pain, and limit post-procedural complications. The pre-procedural injection may cause your toe to be numb anywhere from 1-2 hours.    2. You can take Tylenol, Ibuprofen, Advil, etc as needed for pain if tolerated. Follow label instructions.     3. If you have been given a prescription for antibiotics, take them as instructed and complete the entire prescription.    4. Keep dressing intact until the following morning. Then remove the bandage (you may need to soak it in warm soapy water as the bandage will likely adhere to your skin).    5. Start soaking in warm soapy water for 5-10 minutes twice a day. Wash the toe thoroughly, dry the toe thoroughly. Apply antibiotic wound ointment to base of wound and cover with gauze and Coban dressing (not too tightly) until it stops draining. This may take a few days to weeks, but at that point, you may continue with antibiotic ointment and a band-aid, or you may stop applying a dressing all together. Dressing changes should be done twice daily if you had the permanent/chemical procedure done.    6. You may do activities as tolerated the following day. Find a shoe that is  comfortable and minimizes the amount of rubbing on your toe, as this may increase pain, swelling, etc.    7. Monitor for signs of infection. With this procedure, it is common to have mild surrounding redness and drainage. If the redness involves the entire toe or if you notice red streaks on top of your foot, or if you experience any nausea, vomiting, chills, fevers > 101 degrees, call clinic for a quick appointment.

## 2020-03-09 NOTE — LETTER
"    3/9/2020         RE: Rufus Elena  [address] [place] [address]        Dear Colleague,    Thank you for referring your patient, [facility]ufus Elena[facility]. Please see a copy of my visit note below.    Foot & Ankle Surgery  March 9, 2020    CC: \"ingrown toenail + toenail fungus\"    I was asked to see Rufus Elena regarding the chief complaint by:  self    HPI:  Pt is a 70 year old male who presents with above complaint.  20 year history of bilateral hallux nail issues, including ingrown bilateral border R hallux and nail fungus L hallux.  The R hallux nail can be quite painful and can be very limiting activity-wise.      ROS:   Pos for CC.  The patient denies current nausea, vomiting, chills, fevers, belly pain, calf pain, chest pain or SOB.  Complete remainder of ROS is otherwise neg.    VITALS:    Vitals:    03/09/20 0754   BP: 122/72   Weight: 92.1 kg (203 lb)   Height: 1.759 m (5' 9.25\")       PMH:    Past Medical History:   Diagnosis Date     Benign non-nodular prostatic hyperplasia with lower urinary tract symptoms 11/9/2015     Hypertrophic cardiomyopathy (H)      Uncomplicated asthma     as child       SXHX:    Past Surgical History:   Procedure Laterality Date     LASER REVOLIX PHOTOSELECTIVE VAPORIZATION PROSTATE N/A 12/13/2016    Procedure: LASER REVOLIX / QUANTA PHOTOSELECTIVE VAPORIZATION PROSTATE;  Surgeon: Ryan Ruiz MD;  Location:  OR     ORTHOPEDIC SURGERY      knee scopes bilateral     PROSTATE SURGERY          MEDS:    Current Outpatient Medications   Medication     atorvastatin (LIPITOR) 40 MG tablet     loratadine (CLARITIN) 10 MG tablet     Multiple Vitamins-Minerals (PRESERVISION AREDS 2+MULTI VIT) CAPS     No current facility-administered medications for this visit.        ALL:     Allergies   Allergen Reactions     Seasonal Allergies        FMH:    Family History   Problem Relation Age of Onset     Cancer Mother         Pancreatic     " Cancer Father         Bladder       SocHx:    Social History     Socioeconomic History     Marital status:      Spouse name: Not on file     Number of children: Not on file     Years of education: Not on file     Highest education level: Not on file   Occupational History     Not on file   Social Needs     Financial resource strain: Not on file     Food insecurity     Worry: Not on file     Inability: Not on file     Transportation needs     Medical: Not on file     Non-medical: Not on file   Tobacco Use     Smoking status: Former Smoker     Last attempt to quit: 1971     Years since quittin.2     Smokeless tobacco: Never Used     Tobacco comment: quit at age 21   Substance and Sexual Activity     Alcohol use: Yes     Comment: 15-20 beers per week     Drug use: No     Sexual activity: Yes     Partners: Female   Lifestyle     Physical activity     Days per week: Not on file     Minutes per session: Not on file     Stress: Not on file   Relationships     Social connections     Talks on phone: Not on file     Gets together: Not on file     Attends Protestant service: Not on file     Active member of club or organization: Not on file     Attends meetings of clubs or organizations: Not on file     Relationship status: Not on file     Intimate partner violence     Fear of current or ex partner: Not on file     Emotionally abused: Not on file     Physically abused: Not on file     Forced sexual activity: Not on file   Other Topics Concern     Parent/sibling w/ CABG, MI or angioplasty before 65F 55M? Not Asked      Service Not Asked     Blood Transfusions Not Asked     Caffeine Concern No     Comment: 3 coffee in the mornings     Occupational Exposure Not Asked     Hobby Hazards Not Asked     Sleep Concern Not Asked     Stress Concern Not Asked     Weight Concern Not Asked     Special Diet Not Asked     Back Care Not Asked     Exercise Yes     Comment: walking/biking- 2 hours a day.     Bike Helmet Not  Asked     Seat Belt Not Asked     Self-Exams Not Asked   Social History Narrative     Not on file           EXAMINATION:  Gen:   No apparent distress  Neuro:   A&Ox3, no deficits  Psych:    Answering questions appropriately for age and situation with normal affect  Head:    NCAT  Eye:    Visual scanning without deficit  Ear:    Response to auditory stimuli wnl  Lung:    Non-labored breathing on RA noted  Abd:    NTND per patient report  Lymph:    Neg for pitting/non-pitting edema BLE  Vasc:    Pulses palpable, CFT minimally delayed  Neuro:    Light touch sensation intact to all sensory nerve distributions without paresthesias  Derm:    Hallux nails dystrophic/mycotic.   Bilateral border R hallux nail incurvated, small cut(from patient trying to cut nails) without SOI.  Very tender with pressure  MSK:    ROM, strength wnl without limitation, no pain on palpation noted.  Calf:    Neg for redness, swelling or tenderness    Assessment:  70 year old male with onychocryptosis bilateral border R hallux in setting of dystrophic/mycotic hallux nails      Plan:  Discussed etiologies, anatomy and options  1.  Onychocryptosis bilateral border R hallux in setting of dystrophic/mycotic hallux nails  -Regarding the nail, treatment options were discussed.  They elected to proceed with a procedure, Total permanent avulsion.  See procedure note for details.  Risks that were discussed include but are not limited to infection, wound healing complications, nerve irritation, recurrence of the ingrown nail and the need for further procedures.  Antibiotic:  None needed  -rx for silvadene for BID dressings  -ALT/AST today; future ALT/AST monthly x 3 ordered today.  Will call with abnormalities, and will stop medication.  -Rx for Lamisil    After discussing the procedure, as well as risks, complications and post-procedure instructions, informed consent was obtained.    Anesthesia:  6 cc's of  1% lidocaine plain    Procedure:  After adequate  prep, and with anesthesia achieved, a tourni-cot was applied to the involved toe(and removed after bandage application) and  attention was directed to the R hallux where the nail plate was freed from surrounding soft tissue and then removed in total.  The base of the wound was explored and showed no necrotic tissue, purulence or debris.  Phenol was then applied to the base of the wound for 30s x 3, and sufficient isopropyl alcohol was used to irrigate the wound.  The base of the wound/ nail matrix was curetted after each acid application.  A clean dressing was applied loosely to prevent vascular insult.  The patient tolerated the procedure well without complications.    Post-procedural instructions were dispensed and discussed with the patient.  All questions were answered.           Follow up:  2 weeks or sooner with acute issues      Patient's medical history was reviewed today    Body mass index is 29.76 kg/m .  Weight management plan: Patient was referred to their PCP to discuss a diet and exercise plan.        Julien Lino DPM [facility] FAC[facility]  Podiatric Foot & Ankle Surgeon  [facility]  [phone]      Again, thank you for allowing me to participate in the care of your patient.        Sincerely,        Julien Lino DPM, SAMMIE

## 2020-04-11 DIAGNOSIS — B35.1 ONYCHOMYCOSIS: ICD-10-CM

## 2020-04-11 LAB — AST SERPL W P-5'-P-CCNC: 25 U/L (ref 0–45)

## 2020-04-11 PROCEDURE — 84450 TRANSFERASE (AST) (SGOT): CPT | Performed by: PODIATRIST

## 2020-04-11 PROCEDURE — 36415 COLL VENOUS BLD VENIPUNCTURE: CPT | Performed by: PODIATRIST

## 2020-05-18 DIAGNOSIS — B35.1 ONYCHOMYCOSIS: ICD-10-CM

## 2020-05-18 LAB
ALT SERPL W P-5'-P-CCNC: 31 U/L (ref 0–70)
AST SERPL W P-5'-P-CCNC: 32 U/L (ref 0–45)

## 2020-05-18 PROCEDURE — 84460 ALANINE AMINO (ALT) (SGPT): CPT | Performed by: PODIATRIST

## 2020-05-18 PROCEDURE — 36415 COLL VENOUS BLD VENIPUNCTURE: CPT | Performed by: PODIATRIST

## 2020-05-18 PROCEDURE — 84450 TRANSFERASE (AST) (SGOT): CPT | Performed by: PODIATRIST

## 2020-12-06 ENCOUNTER — HEALTH MAINTENANCE LETTER (OUTPATIENT)
Age: 70
End: 2020-12-06

## 2020-12-10 DIAGNOSIS — R93.1 HIGH CORONARY ARTERY CALCIUM SCORE: ICD-10-CM

## 2020-12-10 DIAGNOSIS — E78.00 PURE HYPERCHOLESTEROLEMIA: ICD-10-CM

## 2020-12-10 RX ORDER — ATORVASTATIN CALCIUM 40 MG/1
40 TABLET, FILM COATED ORAL DAILY
Qty: 90 TABLET | Refills: 0 | Status: SHIPPED | OUTPATIENT
Start: 2020-12-10 | End: 2021-03-08

## 2021-03-08 DIAGNOSIS — R93.1 HIGH CORONARY ARTERY CALCIUM SCORE: ICD-10-CM

## 2021-03-08 DIAGNOSIS — E78.00 PURE HYPERCHOLESTEROLEMIA: ICD-10-CM

## 2021-03-08 RX ORDER — ATORVASTATIN CALCIUM 40 MG/1
40 TABLET, FILM COATED ORAL DAILY
Qty: 90 TABLET | Refills: 0 | Status: SHIPPED | OUTPATIENT
Start: 2021-03-08 | End: 2022-11-17

## 2021-04-11 ENCOUNTER — HEALTH MAINTENANCE LETTER (OUTPATIENT)
Age: 71
End: 2021-04-11

## 2021-06-08 NOTE — TELEPHONE ENCOUNTER
Incoming fax refill request for Lipitor. Pt overdue for follow-up. Per Care Everywhere, pt saw DANIA Patel at [facility] on 5/3/21.    Faxed [facility] back and advised to route refill request to Dr. Ptael's office.     Autumn Griggs, KASEYN, RN, PHN, HNB-BC   6/8/2021 at 8:29 AM

## 2021-09-25 ENCOUNTER — HEALTH MAINTENANCE LETTER (OUTPATIENT)
Age: 71
End: 2021-09-25

## 2022-05-07 ENCOUNTER — HEALTH MAINTENANCE LETTER (OUTPATIENT)
Age: 72
End: 2022-05-07

## 2022-10-19 ENCOUNTER — VIRTUAL VISIT (OUTPATIENT)
Dept: CARDIOLOGY | Facility: CLINIC | Age: 72
End: 2022-10-19
Payer: MEDICARE

## 2022-10-19 DIAGNOSIS — I42.2 HYPERTROPHIC CARDIOMYOPATHY (H): ICD-10-CM

## 2022-10-19 DIAGNOSIS — I10 BENIGN ESSENTIAL HYPERTENSION: Primary | ICD-10-CM

## 2022-10-19 PROCEDURE — 99215 OFFICE O/P EST HI 40 MIN: CPT | Mod: 95 | Performed by: INTERNAL MEDICINE

## 2022-10-19 RX ORDER — FAMOTIDINE 20 MG/1
20 TABLET, FILM COATED ORAL DAILY PRN
COMMUNITY

## 2022-10-19 RX ORDER — LOSARTAN POTASSIUM 100 MG/1
100 TABLET ORAL DAILY
COMMUNITY
Start: 2020-11-10 | End: 2022-11-17

## 2022-10-19 RX ORDER — METOPROLOL SUCCINATE 25 MG/1
1 TABLET, EXTENDED RELEASE ORAL DAILY
COMMUNITY
Start: 2022-02-22 | End: 2022-10-19

## 2022-10-19 RX ORDER — VERAPAMIL HYDROCHLORIDE 240 MG/1
240 CAPSULE, EXTENDED RELEASE ORAL DAILY
COMMUNITY
Start: 2020-10-19 | End: 2022-11-17

## 2022-10-19 RX ORDER — HYDROCHLOROTHIAZIDE 25 MG/1
25 TABLET ORAL DAILY
Qty: 30 TABLET | Refills: 3 | Status: SHIPPED | OUTPATIENT
Start: 2022-10-19 | End: 2022-11-17

## 2022-10-19 NOTE — PROGRESS NOTES
"Darnell is a 72 year old who is being evaluated via a billable video visit.    Vitals - Patient Reported  Systolic (Patient Reported): (!) 150  Diastolic (Patient Reported): 88  Weight (Patient Reported): 97.5 kg (215 lb)  Height (Patient Reported): 177.8 cm (5' 10\")  BMI (Based on Pt Reported Ht/Wt): 30.85  Pulse (Patient Reported): 56    Review Of Systems  Skin: NEGATIVE  Eyes:Ears/Nose/Throat: NEGATIVE  Respiratory: NEGATIVE  Cardiovascular:  Exercises daily, would like to discuss heart rate with exercise  Gastrointestinal: NEGATIVE  Genitourinary:NEGATIVE   Musculoskeletal: NEGATIVE  Neurologic: NEGATIVE  Psychiatric: NEGATIVE  Hematologic/Lymphatic/Immunologic: NEGATIVE  Endocrine:  NEGATIVE    Neli Fuentes LPN    How would you like to obtain your AVS? MyChart  If the video visit is dropped, the invitation should be resent by: Text to cell phone: [phone]  Will anyone else be joining your video visit? No        Video-Visit Details    Video Start Time: 10:25 AM    Type of service:  Video Visit    Video End Time:10:45 AM    Originating Location (pt. Location): Home        Distant Location (provider location):  On-site    Platform used for Video Visit: Renuka       Mr. Elena is a very pleasant 72-year-old gentleman with history of apical hypertrophy diagnosed more than 13 years ago in [place] with mildly dilated aortic root/ascending aorta measuring 3.9 cm, nonobstructive coronary artery disease on the basis of coronary CT angiogram in 2016.  In terms of sudden cardiac death risk stratification in the setting of hypertrophic cardiomyopathy, the maximum LV thickness was 1.6 cm.  There was a small patchy delayed enhancement along the left ventricular apex.  No NSVT on Holter.  No personal history of syncope or family history of sudden cardiac death and normal blood pressure response to exercise.   I saw the patient last time in January 2020.  Today patient is reestablishing care with us however this was set up " as a virtual visit.  He lives in [place].  Patient tells me over last few years since our last meeting he has been diagnosed with hypertension and was seeing a cardiologist.  Presently he is on verapamil to 40 mg extended release daily, Toprol-XL 25 mg daily, losartan 100 mg daily.  His blood pressure does run on the higher side systolic in 140s to 150s.  Also he has noticed that his heart rate does not go up by 100 with exercise.  He is very active hiking and biking.  He is active at least 3 hours a day which involves walking walking biking hiking.  He does get little tired as he is not able to get his heart rate up to 100.  No exertional symptoms like chest discomfort or shortness of breath dizziness presyncope or syncope.     Assessment plan  1.  Apical hypertrophic cardiomyopathy.  No major risk factors for sudden cardiac death.  No syncope or dizziness.  I recommend repeating an echocardiogram  2.  Hypertension.  Currently on losartan, verapamil and Toprol-XL.  I discussed with patient that I am not in favor of using 2 different AV vinny blocking agents like verapamil and metoprolol as this can lower the heart rate especially lower the heart rate response to exercise as in his case.  He is on very low-dose of Toprol-XL I recommend stopping it and replacing with an alternative antihypertensive medication.  I discussed option of hydrochlorothiazide and which is common side effects were discussed with patient.  BMP in June this year overall showed normal kidney functions.  I recommend repeating a BMP in 1 week time.  3.  Nonobstructive artery disease.  Clinically no anginal symptoms.  On aspirin, high intensity statin.    Recommendations  Given that patient is reestablishing care with us almost 3 years later I strongly recommend in person visit for more comprehensive and optimal evaluation.  However there is clear-cut evidence that patient is having issues with heart rate response to exercise because he is on 2  different AV vinny blocking agents.  He is on low-dose of Toprol-XL and I recommend stopping it.  His blood pressure is not well controlled and I recommend starting hydrochlorothiazide 25 mg daily.  Common side effects were discussed with patient I recommend BMP in 1 week time.  I also recommend in person visit in next few weeks with an echocardiogram so that we can reevaluate apical hypertrophic cardiomyopathy and aorta size.    Total video call time spent 20 minutes but overall time spent today 40 minutes including review of outside records and coordination of care.

## 2022-10-19 NOTE — LETTER
"10/19/2022    Remi Wyman MD  [address] [place] [address]    RE: Rufus Pozoir       Dear Colleague,     I had the pleasure of seeing Rufus Elena in the [facility].  Darnell is a 72 year old who is being evaluated via a billable video visit.    Vitals - Patient Reported  Systolic (Patient Reported): (!) 150  Diastolic (Patient Reported): 88  Weight (Patient Reported): 97.5 kg (215 lb)  Height (Patient Reported): 177.8 cm (5' 10\")  BMI (Based on Pt Reported Ht/Wt): 30.85  Pulse (Patient Reported): 56    Review Of Systems  Skin: NEGATIVE  Eyes:Ears/Nose/Throat: NEGATIVE  Respiratory: NEGATIVE  Cardiovascular:  Exercises daily, would like to discuss heart rate with exercise  Gastrointestinal: NEGATIVE  Genitourinary:NEGATIVE   Musculoskeletal: NEGATIVE  Neurologic: NEGATIVE  Psychiatric: NEGATIVE  Hematologic/Lymphatic/Immunologic: NEGATIVE  Endocrine:  NEGATIVE    Neli Fuentes LPN    How would you like to obtain your AVS? MyChart  If the video visit is dropped, the invitation should be resent by: Text to cell phone: [phone]  Will anyone else be joining your video visit? No        Video-Visit Details    Video Start Time: 10:25 AM    Type of service:  Video Visit    Video End Time:10:45 AM    Originating Location (pt. Location): Home        Distant Location (provider location):  On-site    Platform used for Video Visit: [facility]       Mr. Elena is a very pleasant 72-year-old gentleman with history of apical hypertrophy diagnosed more than 13 years ago in [place] with mildly dilated aortic root/ascending aorta measuring 3.9 cm, nonobstructive coronary artery disease on the basis of coronary CT angiogram in 2016.  In terms of sudden cardiac death risk stratification in the setting of hypertrophic cardiomyopathy, the maximum LV thickness was 1.6 cm.  There was a small patchy delayed enhancement along the left ventricular apex.  No NSVT on Holter.  No personal history of " syncope or family history of sudden cardiac death and normal blood pressure response to exercise.   I saw the patient last time in January 2020.  Today patient is reestablishing care with us however this was set up as a virtual visit.  He lives in [place].  Patient tells me over last few years since our last meeting he has been diagnosed with hypertension and was seeing a cardiologist.  Presently he is on verapamil to 40 mg extended release daily, Toprol-XL 25 mg daily, losartan 100 mg daily.  His blood pressure does run on the higher side systolic in 140s to 150s.  Also he has noticed that his heart rate does not go up by 100 with exercise.  He is very active hiking and biking.  He is active at least 3 hours a day which involves walking walking biking hiking.  He does get little tired as he is not able to get his heart rate up to 100.  No exertional symptoms like chest discomfort or shortness of breath dizziness presyncope or syncope.     Assessment plan  1.  Apical hypertrophic cardiomyopathy.  No major risk factors for sudden cardiac death.  No syncope or dizziness.  I recommend repeating an echocardiogram  2.  Hypertension.  Currently on losartan, verapamil and Toprol-XL.  I discussed with patient that I am not in favor of using 2 different AV vinny blocking agents like verapamil and metoprolol as this can lower the heart rate especially lower the heart rate response to exercise as in his case.  He is on very low-dose of Toprol-XL I recommend stopping it and replacing with an alternative antihypertensive medication.  I discussed option of hydrochlorothiazide and which is common side effects were discussed with patient.  BMP in June this year overall showed normal kidney functions.  I recommend repeating a BMP in 1 week time.  3.  Nonobstructive artery disease.  Clinically no anginal symptoms.  On aspirin, high intensity statin.    Recommendations  Given that patient is reestablishing care with us almost 3 years  later I strongly recommend in person visit for more comprehensive and optimal evaluation.  However there is clear-cut evidence that patient is having issues with heart rate response to exercise because he is on 2 different AV vinny blocking agents.  He is on low-dose of Toprol-XL and I recommend stopping it.  His blood pressure is not well controlled and I recommend starting hydrochlorothiazide 25 mg daily.  Common side effects were discussed with patient I recommend BMP in 1 week time.  I also recommend in person visit in next few weeks with an echocardiogram so that we can reevaluate apical hypertrophic cardiomyopathy and aorta size.    Total video call time spent 20 minutes but overall time spent today 40 minutes including review of outside records and coordination of care.    Thank you for allowing me to participate in the care of your patient.      Sincerely,     Cole Burciaga MD     [facility]  cc:   No referring provider defined for this encounter.

## 2022-11-17 ENCOUNTER — OFFICE VISIT (OUTPATIENT)
Dept: CARDIOLOGY | Facility: CLINIC | Age: 72
End: 2022-11-17
Attending: INTERNAL MEDICINE
Payer: MEDICARE

## 2022-11-17 ENCOUNTER — HOSPITAL ENCOUNTER (OUTPATIENT)
Dept: CARDIOLOGY | Facility: CLINIC | Age: 72
Discharge: HOME | End: 2022-11-17
Attending: INTERNAL MEDICINE | Admitting: INTERNAL MEDICINE
Payer: MEDICARE

## 2022-11-17 ENCOUNTER — LAB (OUTPATIENT)
Dept: LAB | Facility: CLINIC | Age: 72
End: 2022-11-17
Payer: MEDICARE

## 2022-11-17 ENCOUNTER — TELEPHONE (OUTPATIENT)
Dept: CARDIOLOGY | Facility: CLINIC | Age: 72
End: 2022-11-17

## 2022-11-17 VITALS
HEIGHT: 70 IN | HEART RATE: 54 BPM | OXYGEN SATURATION: 96 % | BODY MASS INDEX: 31.21 KG/M2 | DIASTOLIC BLOOD PRESSURE: 82 MMHG | SYSTOLIC BLOOD PRESSURE: 138 MMHG | WEIGHT: 218 LBS

## 2022-11-17 DIAGNOSIS — I42.2 HYPERTROPHIC CARDIOMYOPATHY (H): ICD-10-CM

## 2022-11-17 DIAGNOSIS — E78.00 PURE HYPERCHOLESTEROLEMIA: ICD-10-CM

## 2022-11-17 DIAGNOSIS — I10 BENIGN ESSENTIAL HYPERTENSION: ICD-10-CM

## 2022-11-17 DIAGNOSIS — R93.1 HIGH CORONARY ARTERY CALCIUM SCORE: ICD-10-CM

## 2022-11-17 LAB
ALT SERPL W P-5'-P-CCNC: 36 U/L (ref 0–70)
ANION GAP SERPL CALCULATED.3IONS-SCNC: 6 MMOL/L (ref 3–14)
BUN SERPL-MCNC: 28 MG/DL (ref 7–30)
CALCIUM SERPL-MCNC: 8.9 MG/DL (ref 8.5–10.1)
CHLORIDE BLD-SCNC: 106 MMOL/L (ref 94–109)
CHOLEST SERPL-MCNC: 196 MG/DL
CO2 SERPL-SCNC: 25 MMOL/L (ref 20–32)
CREAT SERPL-MCNC: 1.17 MG/DL (ref 0.66–1.25)
GFR SERPL CREATININE-BSD FRML MDRD: 67 ML/MIN/1.73M2
GLUCOSE BLD-MCNC: 97 MG/DL (ref 70–99)
HDLC SERPL-MCNC: 78 MG/DL
LDLC SERPL CALC-MCNC: 99 MG/DL
LVEF ECHO: NORMAL
NONHDLC SERPL-MCNC: 118 MG/DL
POTASSIUM BLD-SCNC: 4.1 MMOL/L (ref 3.4–5.3)
SODIUM SERPL-SCNC: 137 MMOL/L (ref 133–144)
TRIGL SERPL-MCNC: 96 MG/DL

## 2022-11-17 PROCEDURE — 80061 LIPID PANEL: CPT | Performed by: PHYSICIAN ASSISTANT

## 2022-11-17 PROCEDURE — 93306 TTE W/DOPPLER COMPLETE: CPT

## 2022-11-17 PROCEDURE — 36415 COLL VENOUS BLD VENIPUNCTURE: CPT | Performed by: INTERNAL MEDICINE

## 2022-11-17 PROCEDURE — 84460 ALANINE AMINO (ALT) (SGPT): CPT | Performed by: PHYSICIAN ASSISTANT

## 2022-11-17 PROCEDURE — 99214 OFFICE O/P EST MOD 30 MIN: CPT | Mod: 25 | Performed by: PHYSICIAN ASSISTANT

## 2022-11-17 PROCEDURE — 80048 BASIC METABOLIC PNL TOTAL CA: CPT | Performed by: INTERNAL MEDICINE

## 2022-11-17 PROCEDURE — 93306 TTE W/DOPPLER COMPLETE: CPT | Mod: 26 | Performed by: INTERNAL MEDICINE

## 2022-11-17 RX ORDER — VERAPAMIL HYDROCHLORIDE 120 MG/1
120 CAPSULE, EXTENDED RELEASE ORAL DAILY
Qty: 30 CAPSULE | Refills: 0 | Status: SHIPPED | OUTPATIENT
Start: 2022-11-17 | End: 2024-01-11

## 2022-11-17 RX ORDER — HYDROCHLOROTHIAZIDE 25 MG/1
25 TABLET ORAL DAILY
Qty: 90 TABLET | Refills: 3 | Status: SHIPPED | OUTPATIENT
Start: 2022-11-17 | End: 2023-11-09

## 2022-11-17 RX ORDER — ATORVASTATIN CALCIUM 80 MG/1
80 TABLET, FILM COATED ORAL DAILY
COMMUNITY
Start: 2022-11-17 | End: 2022-11-23 | Stop reason: ALTCHOICE

## 2022-11-17 RX ORDER — LOSARTAN POTASSIUM 100 MG/1
100 TABLET ORAL DAILY
Qty: 90 TABLET | Refills: 3 | Status: SHIPPED | OUTPATIENT
Start: 2022-11-17 | End: 2023-11-10

## 2022-11-17 RX ORDER — VERAPAMIL HYDROCHLORIDE 240 MG/1
240 CAPSULE, EXTENDED RELEASE ORAL DAILY
Qty: 90 CAPSULE | Refills: 3 | Status: CANCELLED | OUTPATIENT
Start: 2022-11-17

## 2022-11-17 RX ORDER — AMLODIPINE BESYLATE 5 MG/1
5 TABLET ORAL DAILY
Qty: 90 TABLET | Refills: 3 | Status: SHIPPED | OUTPATIENT
Start: 2022-11-17 | End: 2023-11-09

## 2022-11-17 NOTE — LETTER
11/17/2022    Williams Leung MD  [facility] [address] [place] [address]    RE: Rufus Elena       Dear Colleague,     I had the pleasure of seeing Rufus Elena in the [facility].  [id number]      HPI and Plan:   See dictation    Orders this Visit:  Orders Placed This Encounter   Procedures     Lipid panel reflex to direct LDL Fasting     ALT     Basic metabolic panel     Lipid Profile     ALT     Follow-Up with Cardiology     Orders Placed This Encounter   Medications     atorvastatin (LIPITOR) 80 MG tablet     Sig: Take 1 tablet (80 mg) by mouth daily     hydrochlorothiazide (HYDRODIURIL) 25 MG tablet     Sig: Take 1 tablet (25 mg) by mouth daily     Dispense:  90 tablet     Refill:  3     losartan (COZAAR) 100 MG tablet     Sig: Take 1 tablet (100 mg) by mouth daily     Dispense:  90 tablet     Refill:  3     verapamil ER (VERELAN) 120 MG 24 hr capsule     Sig: Take 1 capsule (120 mg) by mouth daily     Dispense:  30 capsule     Refill:  0     amLODIPine (NORVASC) 5 MG tablet     Sig: Take 1 tablet (5 mg) by mouth daily     Dispense:  90 tablet     Refill:  3     Medications Discontinued During This Encounter   Medication Reason     verapamil ER (VERELAN) 240 MG 24 hr capsule      atorvastatin (LIPITOR) 40 MG tablet Reorder     losartan (COZAAR) 100 MG tablet Reorder     hydrochlorothiazide (HYDRODIURIL) 25 MG tablet Reorder     silver sulfADIAZINE (SILVADENE) 1 % external cream          Encounter Diagnoses   Name Primary?     Benign essential hypertension      Hypertrophic cardiomyopathy (H)      Pure hypercholesterolemia      High coronary artery calcium score        CURRENT MEDICATIONS:  Current Outpatient Medications   Medication Sig Dispense Refill     amLODIPine (NORVASC) 5 MG tablet Take 1 tablet (5 mg) by mouth daily 90 tablet 3     aspirin (ASA) 81 MG EC tablet Take 81 mg by mouth daily       atorvastatin (LIPITOR) 80 MG tablet Take 1 tablet (80 mg) by mouth  daily       famotidine (PEPCID) 20 MG tablet Take 20 mg by mouth daily as needed       hydrochlorothiazide (HYDRODIURIL) 25 MG tablet Take 1 tablet (25 mg) by mouth daily 90 tablet 3     loratadine (CLARITIN) 10 MG tablet Take 10 mg by mouth as needed for allergies       losartan (COZAAR) 100 MG tablet Take 1 tablet (100 mg) by mouth daily 90 tablet 3     Multiple Vitamins-Minerals (PRESERVISION AREDS 2+MULTI VIT) CAPS Dose is unknown but takes 1 pill a day       verapamil ER (VERELAN) 120 MG 24 hr capsule Take 1 capsule (120 mg) by mouth daily 30 capsule 0       ALLERGIES     Allergies   Allergen Reactions     Seasonal Allergies        PAST MEDICAL HISTORY:  Past Medical History:   Diagnosis Date     Benign non-nodular prostatic hyperplasia with lower urinary tract symptoms 2015     Hypertrophic cardiomyopathy (H)      Uncomplicated asthma     as child       PAST SURGICAL HISTORY:  Past Surgical History:   Procedure Laterality Date     LASER REVOLIX PHOTOSELECTIVE VAPORIZATION PROSTATE N/A 2016    Procedure: LASER REVOLIX / QUANTA PHOTOSELECTIVE VAPORIZATION PROSTATE;  Surgeon: Ryan Ruiz MD;  Location: [facility] OR     ORTHOPEDIC SURGERY      knee scopes bilateral     PROSTATE SURGERY         FAMILY HISTORY:  Family History   Problem Relation Age of Onset     Cancer Mother         Pancreatic     Cancer Father         Bladder     Cardiomyopathy Son        SOCIAL HISTORY:  Social History     Socioeconomic History     Marital status:      Spouse name: None     Number of children: None     Years of education: None     Highest education level: None   Tobacco Use     Smoking status: Former     Types: Cigarettes     Quit date:      Years since quittin.9     Smokeless tobacco: Never     Tobacco comments:     quit at age 21   Substance and Sexual Activity     Alcohol use: Yes     Comment: 2-3 beer day, [employer]ors light     Drug use: No     Sexual activity: Yes     Partners: Female   Other Topics  "Concern     Caffeine Concern No     Comment: 3 coffee in the mornings     Exercise Yes     Comment: walking/biking- 2 hours a day.       Review of Systems:  Skin:  Negative     Eyes:  Positive for contacts  ENT:  Negative    Respiratory:  Negative    Cardiovascular:  Negative    Gastroenterology: Negative    Genitourinary:  not assessed    Musculoskeletal:  Negative    Neurologic:  Negative    Psychiatric:  Negative    Heme/Lymph/Imm:  Positive for allergies  Endocrine:  Negative      Physical Exam:  Vitals: /82   Pulse 54   Ht 1.765 m (5' 9.5\")   Wt 98.9 kg (218 lb)   SpO2 96%   BMI 31.73 kg/m     Please refer to dictation for physical exam    Recent Lab Results: all reviewed today  CBC RESULTS:  No results found for: WBC, RBC, HGB, HCT, MCV, MCH, MCHC, RDW, PLT    BMP RESULTS:  Lab Results   Component Value Date     11/17/2022     12/18/2018    POTASSIUM 4.1 11/17/2022    POTASSIUM 4.3 12/18/2018    CHLORIDE 106 11/17/2022    CHLORIDE 108 12/18/2018    CO2 25 11/17/2022    CO2 27 12/18/2018    ANIONGAP 6 11/17/2022    ANIONGAP 4 12/18/2018    GLC 97 11/17/2022     (H) 12/18/2018    BUN 28 11/17/2022    BUN 19 12/18/2018    CR 1.17 11/17/2022    CR 1.03 12/18/2018    GFRESTIMATED 67 11/17/2022    GFRESTIMATED 72 12/18/2018    GFRESTBLACK 87 12/18/2018    GARRISON 8.9 11/17/2022    GARRISON 8.8 12/18/2018        INR RESULTS:  No results found for: INR        CC  Cole Burciaga MD  [id number] [facility] [address]  [place] [id number]        Service Date: 11/17/2022    CLINIC VISIT    PRIMARY CARDIOLOGIST:  Dr. Burciaga    REASON FOR VISIT:  Hypertension, apical hypertrophic cardiomyopathy followup.    HISTORY OF PRESENT ILLNESS:  Mr. Elena is a delightful 72-year-old gentleman who has been following initially by Dr. Burciaga when he was found to have an abnormal echocardiogram with apical variant hypertrophic cardiomyopathy.  He also has mildly dilated aortic root as well as hypertension and hyperlipidemia.  On " "review of his echocardiogram, he has not had an LVOT gradient or FRANCIS.  He followed with Dr. Burciaga for several years and then moving to [place] and followed with the [facility] and is now back to seeing Dr. Burciaga.  When he did a video visit with Dr. Burciaga in October, he was still a little hypertensive and his biggest complaint was his heart rate not improving and him really not able to do the activities he wanted.  For those reasons, he kept him on losartan and verapamil and discontinued his Toprol and added hydrochlorothiazide.  The patient is here today for followup of that.  In general, he has ___ complained over the last several years that he just could not bike or hike as fast as he needed to when he went up hills, etc.  He just did not feel like he had \"second or third gear.\" His heart rate never got above about 100-105, where previously he was able to get his heart rates in the 130s and up to 140 to really get the exercise he wanted.  This has improved a little bit off the Toprol but has not completely resolved.  He has not had syncope or presyncope, as reviewed in Dr. Burciaga notes.  He does not have any other high-risk features of apical hypertrophic cardiomyopathy.    SOCIAL HISTORY:  He lives here in [place].  He loves to road bike, trail bike, particularly in the winter.  He is also a big hiker.  Former smoker, quit in 1971, just while he was a teenager.  About 2-3 beers a day.    PHYSICAL EXAMINATION:    GENERAL:  Well-developed, well-nourished gentleman, fit-appearing, in no acute distress.  HEENT:  Normocephalic, atraumatic.  HEART:  Regular in rate and rhythm.  I do not appreciate murmur, rub or gallop.  LUNGS:  Clear without wheezes, rales or rhonchi.  EXTREMITIES:  Without peripheral edema.    Initial blood pressure elevated, consistent with home blood pressures in the 140s/80s.    ASSESSMENT AND PLAN:    1.  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  This gives us " much more leeway in his medications.  This is stable on echocardiogram that was done today and reviewed in clinic.  2.  Hypertension.  Remains elevated.  Goal blood pressure less than 130/80.  Given that he does not have an LVOT gradient, we certainly can back off on his rate-lowering medications to allow him better cardiac output with exercise.  Today, we are going to slowly wean him off his verapamil just so he does not get any rebound tachycardia.  He will cut back to 120 mg per week and then stop that.  He will then start 5 mg of amlodipine a day which would be a more effective blood pressure medication.  He will remain on the losartan 100 mg daily and hydrochlorothiazide 25 mg daily.  He will continue to check his blood pressures at home and if, after about 2 weeks, they are not in the normal range, he can call us and go up to 7.5 of amlodipine and if that is still not effective, would try 10 but making sure he does not get peripheral edema.  Otherwise, we will see him back in a year.  3.  Mildly enlarged ascending aorta.  Aortic root at 3.7 cm with ascending aorta diameter at 3.9.  Excellent blood pressure control ___.  He is already on an ARB.  Could consider adding a beta blocker to prevent growth as well.  4.  Dyslipidemia.  Appropriately on statin.  Lipid panel was added after our visit and came back with an LDL 99, HDL 78, total cholesterol 196, triglycerides 96.  Given his calcium score was 413, putting him in the 80th percentile for age and gender match and he has a 25% mid-LAD lesion, I am going to switch him to Crestor 40.  He can repeat lipid panel in 3 months as well as an ALT.  my nurse will reach out to him for these changes.  5.  Nonocclusive coronary disease.    Thank you for allowing me to participate in this delightful patient's care.  He can follow up in 1 year with Dr. Burciaga, sooner if his blood pressure is not controlled at home.    Milli Newberry PA-C        D: 11/18/2022   T:  2022   MT: evette    Name:     TONI SWANN  MRN:      [id number]        Account:      [id number]   :      1950           Service Date: 2022       Document: [id number]      Thank you for allowing me to participate in the care of your patient.      Sincerely,     Milli Newberry PA-C     [facility]  cc:   Cole Burciaga MD  [address]  [place] [address]

## 2022-11-17 NOTE — TELEPHONE ENCOUNTER
Received results from today's Echocardiogram:    1. The left ventricle is normal in structure, function and size. The visual  ejection fraction is estimated at 55%.  2. Apical hypertrophy consisent with apical hypertrophic cardiomyopathy.  3. The right ventricle is normal in structure, function and size.  4. No valve disease.  5. The ascending aorta is Borderline dilated. 3.9cm.     No changes compared to echo 12/2019.    Left Ventricle  The left ventricle is normal in structure, function and size. Apical  hypertrophy consisent with apical hypertrophic cardiomyopathy. The visual  ejection fraction is estimated at 55%. Left ventricular diastolic function is  indeterminate. Normal left ventricular wall motion.  Mitral Valve  There is mild to moderate mitral annular calcification. There is trace mitral  Regurgitation.  Ao root diam: 3.7 cm  asc Aorta Diam: 3.9 cm    Writer has sent information to Dr. Burciaga.   Patient is being seen by Milli YIN today.    Manasa Brizuela RN on 11/17/2022 at 2:12 PM

## 2022-11-17 NOTE — PATIENT INSTRUCTIONS
Thank you for visiting with me today.    We discussed: goal blood pressure is less than 130/80.   Echocardiogram looks great.      Medication changes:  Decrease verapamil to 120 mg once a day for 1 week then stop.   Then start amlodipine 5 mg once a day.   Continue other medications.      Follow up: lets us know what your blood pressures are in about 2 weeks, and we'll adjust.       Please call my nurse, Brittany, at [phone] with any questions or concerns.      Reminder: Please bring in all current medications, over the counter supplements and vitamin bottles to your next appointment.

## 2022-11-18 NOTE — PROGRESS NOTES
"Service Date: 11/17/2022    CLINIC VISIT    PRIMARY CARDIOLOGIST:  Dr. Burciaga    REASON FOR VISIT:  Hypertension, apical hypertrophic cardiomyopathy followup.    HISTORY OF PRESENT ILLNESS:  Mr. Elena is a delightful 72-year-old gentleman who has been following initially by Dr. Burciaga when he was found to have an abnormal echocardiogram with apical variant hypertrophic cardiomyopathy.  He also has mildly dilated aortic root as well as hypertension and hyperlipidemia.  On review of his echocardiogram, he has not had an LVOT gradient or FRANCIS.  He followed with Dr. Burciaga for several years and then moving to [place] and followed with the [facility] and is now back to seeing Dr. Burciaga.  When he did a video visit with Dr. Burciaga in October, he was still a little hypertensive and his biggest complaint was his heart rate not improving and him really not able to do the activities he wanted.  For those reasons, he kept him on losartan and verapamil and discontinued his Toprol and added hydrochlorothiazide.  The patient is here today for followup of that.  In general, he has complained over the last several years that he just could not bike or hike as fast as he needed to when he went up hills, etc.  He just did not feel like he had \"second or third gear.\" His heart rate never got above about 100-105, where previously he was able to get his heart rates in the 130s and up to 140 to really get the exercise he wanted.  This has improved a little bit off the Toprol but has not completely resolved.  He has not had syncope or presyncope, as reviewed in Dr. Burciaga notes.  He does not have any other high-risk features of apical hypertrophic cardiomyopathy.    SOCIAL HISTORY:  He lives here in [place].  He loves to road bike, trail bike, particularly in the winter.  He is also a big hiker.  Former smoker, quit in 1971, just while he was a teenager.  About 2-3 beers a day.    PHYSICAL EXAMINATION:    GENERAL:  Well-developed, well-nourished " gentleman, fit-appearing, in no acute distress.  HEENT:  Normocephalic, atraumatic.  HEART:  Regular in rate and rhythm.  I do not appreciate murmur, rub or gallop.  LUNGS:  Clear without wheezes, rales or rhonchi.  EXTREMITIES:  Without peripheral edema.    Initial blood pressure elevated, consistent with home blood pressures in the 140s/80s.    ASSESSMENT AND PLAN:    1.  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  This gives us much more leeway in his medications.  This is stable on echocardiogram that was done today and reviewed in clinic.  2.  Hypertension.  Remains elevated.  Goal blood pressure less than 130/80.  Given that he does not have an LVOT gradient, we certainly can back off on his rate-lowering medications to allow him better cardiac output with exercise.  Today, we are going to slowly wean him off his verapamil just so he does not get any rebound tachycardia.  He will cut back to 120 mg per week and then stop that.  He will then start 5 mg of amlodipine a day which would be a more effective blood pressure medication.  He will remain on the losartan 100 mg daily and hydrochlorothiazide 25 mg daily.  He will continue to check his blood pressures at home and if, after about 2 weeks, they are not in the normal range, he can call us and go up to 7.5 of amlodipine and if that is still not effective, would try 10 but making sure he does not get peripheral edema.  Otherwise, we will see him back in a year.  3.  Mildly enlarged ascending aorta.  Aortic root at 3.7 cm with ascending aorta diameter at 3.9.  Excellent blood pressure control  with goal bp <120/90.  He is already on an ARB.  Could consider adding a beta blocker to prevent growth as well.  4.  Dyslipidemia.  Appropriately on statin.  Lipid panel was added after our visit and came back with an LDL 99, HDL 78, total cholesterol 196, triglycerides 96.  Given his calcium score was 413, putting him in the 80th  percentile for age and gender match and he has a 25% mid-LAD lesion, I am going to switch him to Crestor 40.  He can repeat lipid panel in 3 months as well as an ALT.  my nurse will reach out to him for these changes.  5.  Nonocclusive coronary disease.    Thank you for allowing me to participate in this delightful patient's care.  He can follow up in 1 year with Dr. Burciaga, sooner if his blood pressure is not controlled at home.    Milli Newberry PA-C        D: 2022   T: 2022   MT: evette    Name:     TONI SWANN  MRN:      [id number]        Account:      [id number]   :      1950           Service Date: 2022       Document: [id number]

## 2022-11-18 NOTE — PROGRESS NOTES
[id number]      HPI and Plan:   See dictation    Orders this Visit:  Orders Placed This Encounter   Procedures     Lipid panel reflex to direct LDL Fasting     ALT     Basic metabolic panel     Lipid Profile     ALT     Follow-Up with Cardiology     Orders Placed This Encounter   Medications     atorvastatin (LIPITOR) 80 MG tablet     Sig: Take 1 tablet (80 mg) by mouth daily     hydrochlorothiazide (HYDRODIURIL) 25 MG tablet     Sig: Take 1 tablet (25 mg) by mouth daily     Dispense:  90 tablet     Refill:  3     losartan (COZAAR) 100 MG tablet     Sig: Take 1 tablet (100 mg) by mouth daily     Dispense:  90 tablet     Refill:  3     verapamil ER (VERELAN) 120 MG 24 hr capsule     Sig: Take 1 capsule (120 mg) by mouth daily     Dispense:  30 capsule     Refill:  0     amLODIPine (NORVASC) 5 MG tablet     Sig: Take 1 tablet (5 mg) by mouth daily     Dispense:  90 tablet     Refill:  3     Medications Discontinued During This Encounter   Medication Reason     verapamil ER (VERELAN) 240 MG 24 hr capsule      atorvastatin (LIPITOR) 40 MG tablet Reorder     losartan (COZAAR) 100 MG tablet Reorder     hydrochlorothiazide (HYDRODIURIL) 25 MG tablet Reorder     silver sulfADIAZINE (SILVADENE) 1 % external cream          Encounter Diagnoses   Name Primary?     Benign essential hypertension      Hypertrophic cardiomyopathy (H)      Pure hypercholesterolemia      High coronary artery calcium score        CURRENT MEDICATIONS:  Current Outpatient Medications   Medication Sig Dispense Refill     amLODIPine (NORVASC) 5 MG tablet Take 1 tablet (5 mg) by mouth daily 90 tablet 3     aspirin (ASA) 81 MG EC tablet Take 81 mg by mouth daily       atorvastatin (LIPITOR) 80 MG tablet Take 1 tablet (80 mg) by mouth daily       famotidine (PEPCID) 20 MG tablet Take 20 mg by mouth daily as needed       hydrochlorothiazide (HYDRODIURIL) 25 MG tablet Take 1 tablet (25 mg) by mouth daily 90 tablet 3     loratadine (CLARITIN) 10 MG tablet Take 10  mg by mouth as needed for allergies       losartan (COZAAR) 100 MG tablet Take 1 tablet (100 mg) by mouth daily 90 tablet 3     Multiple Vitamins-Minerals (PRESERVISION AREDS 2+MULTI VIT) CAPS Dose is unknown but takes 1 pill a day       verapamil ER (VERELAN) 120 MG 24 hr capsule Take 1 capsule (120 mg) by mouth daily 30 capsule 0       ALLERGIES     Allergies   Allergen Reactions     Seasonal Allergies        PAST MEDICAL HISTORY:  Past Medical History:   Diagnosis Date     Benign non-nodular prostatic hyperplasia with lower urinary tract symptoms 2015     Hypertrophic cardiomyopathy (H)      Uncomplicated asthma     as child       PAST SURGICAL HISTORY:  Past Surgical History:   Procedure Laterality Date     LASER REVOLIX PHOTOSELECTIVE VAPORIZATION PROSTATE N/A 2016    Procedure: LASER REVOLIX / QUANTA PHOTOSELECTIVE VAPORIZATION PROSTATE;  Surgeon: Ryan Ruiz MD;  Location: [facility] OR     ORTHOPEDIC SURGERY      knee scopes bilateral     PROSTATE SURGERY         FAMILY HISTORY:  Family History   Problem Relation Age of Onset     Cancer Mother         Pancreatic     Cancer Father         Bladder     Cardiomyopathy Son        SOCIAL HISTORY:  Social History     Socioeconomic History     Marital status:      Spouse name: None     Number of children: None     Years of education: None     Highest education level: None   Tobacco Use     Smoking status: Former     Types: Cigarettes     Quit date:      Years since quittin.9     Smokeless tobacco: Never     Tobacco comments:     quit at age 21   Substance and Sexual Activity     Alcohol use: Yes     Comment: 2-3 beer day, [employer] light     Drug use: No     Sexual activity: Yes     Partners: Female   Other Topics Concern     Caffeine Concern No     Comment: 3 coffee in the mornings     Exercise Yes     Comment: walking/biking- 2 hours a day.       Review of Systems:  Skin:  Negative     Eyes:  Positive for contacts  ENT:  Negative   "  Respiratory:  Negative    Cardiovascular:  Negative    Gastroenterology: Negative    Genitourinary:  not assessed    Musculoskeletal:  Negative    Neurologic:  Negative    Psychiatric:  Negative    Heme/Lymph/Imm:  Positive for allergies  Endocrine:  Negative      Physical Exam:  Vitals: /82   Pulse 54   Ht 1.765 m (5' 9.5\")   Wt 98.9 kg (218 lb)   SpO2 96%   BMI 31.73 kg/m     Please refer to dictation for physical exam    Recent Lab Results: all reviewed today  CBC RESULTS:  No results found for: WBC, RBC, HGB, HCT, MCV, MCH, MCHC, RDW, PLT    BMP RESULTS:  Lab Results   Component Value Date     11/17/2022     12/18/2018    POTASSIUM 4.1 11/17/2022    POTASSIUM 4.3 12/18/2018    CHLORIDE 106 11/17/2022    CHLORIDE 108 12/18/2018    CO2 25 11/17/2022    CO2 27 12/18/2018    ANIONGAP 6 11/17/2022    ANIONGAP 4 12/18/2018    GLC 97 11/17/2022     (H) 12/18/2018    BUN 28 11/17/2022    BUN 19 12/18/2018    CR 1.17 11/17/2022    CR 1.03 12/18/2018    GFRESTIMATED 67 11/17/2022    GFRESTIMATED 72 12/18/2018    GFRESTBLACK 87 12/18/2018    GARRISON 8.9 11/17/2022    GARRISON 8.8 12/18/2018        INR RESULTS:  No results found for: INR        CC  Cole Burciaga MD  [address]  [place] [id number]      "

## 2022-11-23 ENCOUNTER — CARE COORDINATION (OUTPATIENT)
Dept: CARDIOLOGY | Facility: CLINIC | Age: 72
End: 2022-11-23

## 2022-11-23 DIAGNOSIS — E78.5 HYPERLIPIDEMIA WITH TARGET LDL LESS THAN 70: ICD-10-CM

## 2022-11-23 DIAGNOSIS — E78.5 HYPERLIPIDEMIA WITH TARGET LDL LESS THAN 100: Primary | ICD-10-CM

## 2022-11-23 RX ORDER — ROSUVASTATIN CALCIUM 40 MG/1
40 TABLET, COATED ORAL DAILY
Qty: 90 TABLET | Refills: 4 | Status: SHIPPED | OUTPATIENT
Start: 2022-11-23 | End: 2024-02-05

## 2022-11-23 NOTE — PROGRESS NOTES
Milli Newberry PA-C   11/18/2022  1:00 PM CST       Lipids came back after clinic visit.  Ideally, we'd have his ldl <70.  Pls have him stop atorvastatin and start crestor 40.  Repeat lipids and alt in 3 months.      Component      Latest Ref Rng & Units 11/17/2022   Sodium      133 - 144 mmol/L 137   Potassium      3.4 - 5.3 mmol/L 4.1   Chloride      94 - 109 mmol/L 106   Carbon Dioxide      20 - 32 mmol/L 25   Anion Gap      3 - 14 mmol/L 6   Urea Nitrogen      7 - 30 mg/dL 28   Creatinine      0.66 - 1.25 mg/dL 1.17   Calcium      8.5 - 10.1 mg/dL 8.9   Glucose      70 - 99 mg/dL 97   GFR Estimate      >60 mL/min/1.73m2 67   Cholesterol      <200 mg/dL 196   Triglycerides      <150 mg/dL 96   HDL Cholesterol      >=40 mg/dL 78   LDL Cholesterol Calculated      <=100 mg/dL 99   Non HDL Cholesterol      <130 mg/dL 118   ALT      0 - 70 U/L 36   ================================================    11/23/22 Called to patient with Milli Newberry PA-C recommendations. Darnell reports understanding and agreement to plan. Rx sent to pharmacy. Darnell agrees to call with concerns and reports he may have to go up slowly on the crestor as he had to do that with the lipitor. Patient wlll let us know if he has any problems with starting drug as prescribed dose and if any side effects or concerns after starting.  Brittany Morales RN 11/23/22 3:34 PM

## 2023-02-14 ENCOUNTER — DOCUMENTATION ONLY (OUTPATIENT)
Dept: CARDIOLOGY | Facility: CLINIC | Age: 73
End: 2023-02-14
Payer: MEDICARE

## 2023-02-20 NOTE — PROGRESS NOTES
Patient called today to say that the outside clinic has not received the lab orders we faxed on 2/14.    We mistakenly faxed it to [facility] and it should have been [facility] in [place]. Its the same fax so I refaxed it this morning.

## 2023-02-28 ENCOUNTER — TELEPHONE (OUTPATIENT)
Dept: CARDIOLOGY | Facility: CLINIC | Age: 73
End: 2023-02-28
Payer: MEDICARE

## 2023-02-28 NOTE — TELEPHONE ENCOUNTER
Team has received faxed information from outside lab in [place], [place], drawn on 2\28\23:    ALT   30  Total Cholesterol     182  Triglycerides     99  HDL      74  VLDL     20  LDL       89    Manasa Brizuela RN on 2/28/2023 at 12:40 PM

## 2023-03-08 NOTE — PROGRESS NOTES
3/8/2023 Lipid panel to Milli Newberry PA-C for review.  See note from 11/2022  Attempted to reach patient, but unable.   Brittany Morales RN 03/08/23 11:55 AM    Component      Latest Ref Rng & Units 2/28/2023   Cholesterol (External)      <200 mg/dL 182   Triglycerides (External)      10 - 150 mg/dL 99   HDL Cholesterol (External)      >40 mg/dL 74   LDL-Cholesterol (External)      <100 mg/dL 89   ALT (External)      <50 U/L 30

## 2023-03-12 NOTE — PROGRESS NOTES
Lipids improved, I'd still like his LDL <70 if possible, but there is benefit to being on crestor 40 and being above goal.  If he thinks he can improve exercise and diet, that would be best.  IF he thinks he's already eating well and exercising 150 mins a week add zetia 10 mg daily and repeat labs in 3 months.

## 2023-03-14 NOTE — PROGRESS NOTES
More, Milli Colvin PA-C 3/13/23     Lipids improved, I'd still like his LDL <70 if possible, but there is benefit to being on crestor 40 and being above goal.  If he thinks he can improve exercise and diet, that would be best.  IF he thinks he's already eating well and exercising 150 mins a week add zetia 10 mg daily and repeat labs in 3 months.          ----------------------------------------------------------------------------------------------------    3/14/23 Called to Patient with labs and Milli's comments and recommendations.  Patient states kamryn and Darnell would like to try to get better on diet and exercise before adding a new drug.  He will visit with providers as planned for future review and recommendations and will call sooner with concerns.  Brittany Morales RN 03/14/23 9:53 AM    Component      Latest Ref Rng & Units 2/28/2023   Cholesterol (External)      <200 mg/dL 182   Triglycerides (External)      10 - 150 mg/dL 99   HDL Cholesterol (External)      >40 mg/dL 74   LDL-Cholesterol (External)      <100 mg/dL 89   ALT (External)      <50 U/L 30

## 2023-07-15 ENCOUNTER — HEALTH MAINTENANCE LETTER (OUTPATIENT)
Age: 73
End: 2023-07-15

## 2023-11-09 ENCOUNTER — TELEPHONE (OUTPATIENT)
Dept: CARDIOLOGY | Facility: CLINIC | Age: 73
End: 2023-11-09
Payer: MEDICARE

## 2023-11-09 DIAGNOSIS — E78.5 HYPERLIPIDEMIA WITH TARGET LDL LESS THAN 100: Primary | ICD-10-CM

## 2023-11-09 DIAGNOSIS — I10 BENIGN ESSENTIAL HYPERTENSION: ICD-10-CM

## 2023-11-09 RX ORDER — HYDROCHLOROTHIAZIDE 25 MG/1
25 TABLET ORAL DAILY
Qty: 90 TABLET | Refills: 0 | Status: SHIPPED | OUTPATIENT
Start: 2023-11-09 | End: 2024-02-05

## 2023-11-09 RX ORDER — AMLODIPINE BESYLATE 5 MG/1
5 TABLET ORAL DAILY
Qty: 90 TABLET | Refills: 0 | Status: SHIPPED | OUTPATIENT
Start: 2023-11-09 | End: 2024-02-05

## 2023-11-09 NOTE — PROGRESS NOTES
Refill Request Progress Note    Refill request from: [facility]    Medication requested:   hydrochlorothiazide 25 mg daily    Last office visit:  Milli Newberry 2022    Labs: 2022  Orders for annual labs  - replaced lab orders to have completed with Dr Burciaga appointment.    Future Appointments:   Future Appointments   Date Time Provider Department Center   2024 11:00 AM Cole Burciaga MD [facility] Refill Guideline reviewed.  Medication meets criteria for refill.    Brittany Morales RN 23 1:44 PM

## 2023-11-10 DIAGNOSIS — I10 BENIGN ESSENTIAL HYPERTENSION: ICD-10-CM

## 2023-11-10 RX ORDER — LOSARTAN POTASSIUM 100 MG/1
100 TABLET ORAL DAILY
Qty: 90 TABLET | Refills: 0 | Status: SHIPPED | OUTPATIENT
Start: 2023-11-10 | End: 2024-02-05

## 2024-01-11 ENCOUNTER — OFFICE VISIT (OUTPATIENT)
Dept: CARDIOLOGY | Facility: CLINIC | Age: 74
End: 2024-01-11
Payer: MEDICARE

## 2024-01-11 VITALS
BODY MASS INDEX: 31.84 KG/M2 | WEIGHT: 215 LBS | OXYGEN SATURATION: 97 % | SYSTOLIC BLOOD PRESSURE: 132 MMHG | HEIGHT: 69 IN | DIASTOLIC BLOOD PRESSURE: 88 MMHG | HEART RATE: 64 BPM

## 2024-01-11 DIAGNOSIS — I42.2 APICAL VARIANT HYPERTROPHIC CARDIOMYOPATHY (H): Primary | ICD-10-CM

## 2024-01-11 DIAGNOSIS — I10 BENIGN ESSENTIAL HYPERTENSION: ICD-10-CM

## 2024-01-11 DIAGNOSIS — E78.5 HYPERLIPIDEMIA WITH TARGET LDL LESS THAN 100: ICD-10-CM

## 2024-01-11 LAB
ALT SERPL W P-5'-P-CCNC: 23 U/L (ref 0–70)
ANION GAP SERPL CALCULATED.3IONS-SCNC: 14 MMOL/L (ref 7–15)
BUN SERPL-MCNC: 25.5 MG/DL (ref 8–23)
CALCIUM SERPL-MCNC: 9.3 MG/DL (ref 8.8–10.2)
CHLORIDE SERPL-SCNC: 104 MMOL/L (ref 98–107)
CHOLEST SERPL-MCNC: 159 MG/DL
CREAT SERPL-MCNC: 1.15 MG/DL (ref 0.67–1.17)
DEPRECATED HCO3 PLAS-SCNC: 23 MMOL/L (ref 22–29)
EGFRCR SERPLBLD CKD-EPI 2021: 68 ML/MIN/1.73M2
FASTING STATUS PATIENT QL REPORTED: YES
GLUCOSE SERPL-MCNC: 102 MG/DL (ref 70–99)
HDLC SERPL-MCNC: 70 MG/DL
LDLC SERPL CALC-MCNC: 78 MG/DL
NONHDLC SERPL-MCNC: 89 MG/DL
POTASSIUM SERPL-SCNC: 4.1 MMOL/L (ref 3.4–5.3)
SODIUM SERPL-SCNC: 141 MMOL/L (ref 135–145)
TRIGL SERPL-MCNC: 55 MG/DL

## 2024-01-11 PROCEDURE — 84460 ALANINE AMINO (ALT) (SGPT): CPT | Performed by: INTERNAL MEDICINE

## 2024-01-11 PROCEDURE — 99214 OFFICE O/P EST MOD 30 MIN: CPT | Performed by: INTERNAL MEDICINE

## 2024-01-11 PROCEDURE — 80061 LIPID PANEL: CPT | Performed by: INTERNAL MEDICINE

## 2024-01-11 PROCEDURE — 80048 BASIC METABOLIC PNL TOTAL CA: CPT | Performed by: INTERNAL MEDICINE

## 2024-01-11 PROCEDURE — 36415 COLL VENOUS BLD VENIPUNCTURE: CPT | Performed by: INTERNAL MEDICINE

## 2024-01-11 ASSESSMENT — PAIN SCALES - GENERAL: PAINLEVEL: NO PAIN (0)

## 2024-01-11 NOTE — LETTER
1/11/2024    Williams Leung MD  [address]  [place] [address]    RE: Rufus Elena       Dear Colleague,     I had the pleasure of seeing Rufus Elena in the [facility].  HPI and Plan:   Mr Elena is a very pleasant 74-year-old gentleman with history of apical hypertrophic cardiomyopathy diagnosed about 15 years ago in [place], cardiac MRI in 2016 confirming apical hypertrophic cardiomyopathy with maximal thickness of 1.6 cm and some patchy delayed enhancement, other comorbidities of hypertension, nonobstructive coronary disease diagnosed on coronary CT angiogram 2016.  Today patient is coming for routine follow-up.  He is retired.  He lives in [place].  He is fairly active he does biking hiking and fishing.  No exertional symptoms or chest discomfort shortness of breath dizziness presyncope or syncope.  Previously he was having issues with physical activity due to inability to get his heart rate up as he was on verapamil and metoprolol and both of these were discontinued and replaced with alternative antihypertensive medications.  Since that time he is feeling quite well with physical activity.  For hypertension he is on hydrochlorothiazide 25 mg daily, amlodipine 5 mg daily, losartan 100 mg daily.  He is on baby aspirin and Crestor 40 mg daily.  Latest lipid panel shows LDL 78 which is the best so far in the last several years.  HDL is elevated at 70.  Triglycerides are well-controlled at 55.  ALT is normal.  BMP shows normal sodium potassium and creatinine.  He does not use any tobacco or alcohol.  No history of syncope or family history of sudden cardiac death.  Last echocardiogram in November 2022 showed LVEF of 55% and apical hypertrophy cardiomyopathy with borderline dilated ascending order measuring 3.9 cm similar to previous echocardiogram.      Assessment plan  Apical hypertrophic cardiomyopathy.  Maximal wall thickness 1.6 cm.  No personal history of syncope.  No family history of  sudden cardiac death.  Again discussed with patient importance of screening for first-degree relatives.  Patient tells me that his son has also been diagnosed with apical hypertrophic cardiomyopathy and is doing well.  Nonobstructive coronary disease.  Clinically no anginal symptoms.  On aspirin and high intensity statin.  LDL reasonably controlled.  Discussed with patient that we can consider adding Zetia however patient tells me that he is planning to lose additional 4 to 5 pounds of weight and this should help decrease LDL further.  Hypertension overall controlled although of late patient has seen systolic blood pressure slightly creeping up.  Patient is planning to lose another 4 to 5 pounds of weight that should help with blood pressure.  If blood pressure is increasing he will let us know 1 option would be to increase amlodipine dose at that time.  Borderline dilated ascending aorta stable in size.    Recommendations  Overall cardiac status wise he appears stable  Continue aspirin, high intensity statin, angiotensin blocker, amlodipine, hydrochlorothiazide  Follow-up in a year with lipid panel, sooner if he notes any change in clinical status.    Today's clinic visit entailed:  Review of the result(s) of each unique test - lipid panel, ALT, BMP            Orders Placed This Encounter   Procedures    Lipid panel reflex to direct LDL Fasting    Follow-Up with Cardiology       No orders of the defined types were placed in this encounter.      Medications Discontinued During This Encounter   Medication Reason    verapamil ER (VERELAN) 120 MG 24 hr capsule Therapy completed (No AVS)         Encounter Diagnoses   Name Primary?    Benign essential hypertension     Hyperlipidemia with target LDL less than 100     Apical variant hypertrophic cardiomyopathy (H) Yes       CURRENT MEDICATIONS:  Current Outpatient Medications   Medication Sig Dispense Refill    amLODIPine (NORVASC) 5 MG tablet Take 1 tablet (5 mg) by mouth  daily Appointment required for further refills 90 tablet 0    aspirin (ASA) 81 MG EC tablet Take 81 mg by mouth daily      famotidine (PEPCID) 20 MG tablet Take 20 mg by mouth daily as needed      hydrochlorothiazide (HYDRODIURIL) 25 MG tablet Take 1 tablet (25 mg) by mouth daily 90 tablet 0    losartan (COZAAR) 100 MG tablet Take 1 tablet (100 mg) by mouth daily 90 tablet 0    Multiple Vitamins-Minerals (PRESERVISION AREDS 2+MULTI VIT) CAPS Dose is unknown but takes 1 pill a day      rosuvastatin (CRESTOR) 40 MG tablet Take 1 tablet (40 mg) by mouth daily 90 tablet 4    loratadine (CLARITIN) 10 MG tablet Take 10 mg by mouth as needed for allergies (Patient not taking: Reported on 2024)         ALLERGIES     Allergies   Allergen Reactions    Seasonal Allergies        PAST MEDICAL HISTORY:  Past Medical History:   Diagnosis Date    Benign non-nodular prostatic hyperplasia with lower urinary tract symptoms 2015    Hypertrophic cardiomyopathy (H)     Uncomplicated asthma     as child       PAST SURGICAL HISTORY:  Past Surgical History:   Procedure Laterality Date    LASER REVOLIX PHOTOSELECTIVE VAPORIZATION PROSTATE N/A 2016    Procedure: LASER REVOLIX / QUANTA PHOTOSELECTIVE VAPORIZATION PROSTATE;  Surgeon: Ryan Ruiz MD;  Location: [facility] OR    ORTHOPEDIC SURGERY      knee scopes bilateral    PROSTATE SURGERY         FAMILY HISTORY:  Family History   Problem Relation Age of Onset    Cancer Mother         Pancreatic    Cancer Father         Bladder    Cardiomyopathy Son        SOCIAL HISTORY:  Social History     Socioeconomic History    Marital status:    Tobacco Use    Smoking status: Former     Types: Cigarettes     Quit date:      Years since quittin.0    Smokeless tobacco: Never    Tobacco comments:     quit at age 21   Substance and Sexual Activity    Alcohol use: Yes     Comment: 2-3 beer day, [employer] light    Drug use: No    Sexual activity: Yes     Partners: Female  "  Other Topics Concern    Caffeine Concern No     Comment: 3 coffee in the mornings    Exercise Yes     Comment: walking/biking- 2 hours a day.       Review of Systems:  Skin:  Negative       Eyes:  Negative      ENT:  Negative      Respiratory:  Negative       Cardiovascular:  Negative for;palpitations;edema, no exertional chest discomfort, presyncope syncope  Gastroenterology: Negative      Genitourinary:  Negative      Musculoskeletal:  Negative      Neurologic:  Negative      Psychiatric:  Negative      Heme/Lymph/Imm:  Positive for allergies    Endocrine:  Negative        Physical Exam:  Vitals: /88 (BP Location: Right arm, Patient Position: Sitting, Cuff Size: Adult Large)   Pulse 64   Ht 1.765 m (5' 9.49\")   Wt 97.5 kg (215 lb)   SpO2 97%   BMI 31.31 kg/m      General patient appears comfortable  Neck normal JVP  Cardiovascular system S1-S2 normal no murmur rub or gallop  Respiratory system clear to auscultation  Extremities no edema  Neurological alert, oriented    CC  Milli Newberry, PA-C  [address]  [place] [address]      Thank you for allowing me to participate in the care of your patient.      Sincerely,     Cole Burciaga MD     [facility]  "

## 2024-01-11 NOTE — PROGRESS NOTES
HPI and Plan:   Mr Elena is a very pleasant 74-year-old gentleman with history of apical hypertrophic cardiomyopathy diagnosed about 15 years ago in [place], cardiac MRI in 2016 confirming apical hypertrophic cardiomyopathy with maximal thickness of 1.6 cm and some patchy delayed enhancement, other comorbidities of hypertension, nonobstructive coronary disease diagnosed on coronary CT angiogram 2016.  Today patient is coming for routine follow-up.  He is retired.  He lives in [place].  He is fairly active he does biking hiking and fishing.  No exertional symptoms or chest discomfort shortness of breath dizziness presyncope or syncope.  Previously he was having issues with physical activity due to inability to get his heart rate up as he was on verapamil and metoprolol and both of these were discontinued and replaced with alternative antihypertensive medications.  Since that time he is feeling quite well with physical activity.  For hypertension he is on hydrochlorothiazide 25 mg daily, amlodipine 5 mg daily, losartan 100 mg daily.  He is on baby aspirin and Crestor 40 mg daily.  Latest lipid panel shows LDL 78 which is the best so far in the last several years.  HDL is elevated at 70.  Triglycerides are well-controlled at 55.  ALT is normal.  BMP shows normal sodium potassium and creatinine.  He does not use any tobacco or alcohol.  No history of syncope or family history of sudden cardiac death.  Last echocardiogram in November 2022 showed LVEF of 55% and apical hypertrophy cardiomyopathy with borderline dilated ascending order measuring 3.9 cm similar to previous echocardiogram.      Assessment plan  Apical hypertrophic cardiomyopathy.  Maximal wall thickness 1.6 cm.  No personal history of syncope.  No family history of sudden cardiac death.  Again discussed with patient importance of screening for first-degree relatives.  Patient tells me that his son has also been diagnosed with apical hypertrophic  cardiomyopathy and is doing well.  Nonobstructive coronary disease.  Clinically no anginal symptoms.  On aspirin and high intensity statin.  LDL reasonably controlled.  Discussed with patient that we can consider adding Zetia however patient tells me that he is planning to lose additional 4 to 5 pounds of weight and this should help decrease LDL further.  Hypertension overall controlled although of late patient has seen systolic blood pressure slightly creeping up.  Patient is planning to lose another 4 to 5 pounds of weight that should help with blood pressure.  If blood pressure is increasing he will let us know 1 option would be to increase amlodipine dose at that time.  Borderline dilated ascending aorta stable in size.    Recommendations  Overall cardiac status wise he appears stable  Continue aspirin, high intensity statin, angiotensin blocker, amlodipine, hydrochlorothiazide  Follow-up in a year with lipid panel, sooner if he notes any change in clinical status.    Today's clinic visit entailed:  Review of the result(s) of each unique test - lipid panel, ALT, BMP            Orders Placed This Encounter   Procedures    Lipid panel reflex to direct LDL Fasting    Follow-Up with Cardiology       No orders of the defined types were placed in this encounter.      Medications Discontinued During This Encounter   Medication Reason    verapamil ER (VERELAN) 120 MG 24 hr capsule Therapy completed (No AVS)         Encounter Diagnoses   Name Primary?    Benign essential hypertension     Hyperlipidemia with target LDL less than 100     Apical variant hypertrophic cardiomyopathy (H) Yes       CURRENT MEDICATIONS:  Current Outpatient Medications   Medication Sig Dispense Refill    amLODIPine (NORVASC) 5 MG tablet Take 1 tablet (5 mg) by mouth daily Appointment required for further refills 90 tablet 0    aspirin (ASA) 81 MG EC tablet Take 81 mg by mouth daily      famotidine (PEPCID) 20 MG tablet Take 20 mg by mouth daily  as needed      hydrochlorothiazide (HYDRODIURIL) 25 MG tablet Take 1 tablet (25 mg) by mouth daily 90 tablet 0    losartan (COZAAR) 100 MG tablet Take 1 tablet (100 mg) by mouth daily 90 tablet 0    Multiple Vitamins-Minerals (PRESERVISION AREDS 2+MULTI VIT) CAPS Dose is unknown but takes 1 pill a day      rosuvastatin (CRESTOR) 40 MG tablet Take 1 tablet (40 mg) by mouth daily 90 tablet 4    loratadine (CLARITIN) 10 MG tablet Take 10 mg by mouth as needed for allergies (Patient not taking: Reported on 2024)         ALLERGIES     Allergies   Allergen Reactions    Seasonal Allergies        PAST MEDICAL HISTORY:  Past Medical History:   Diagnosis Date    Benign non-nodular prostatic hyperplasia with lower urinary tract symptoms 2015    Hypertrophic cardiomyopathy (H)     Uncomplicated asthma     as child       PAST SURGICAL HISTORY:  Past Surgical History:   Procedure Laterality Date    LASER REVOLIX PHOTOSELECTIVE VAPORIZATION PROSTATE N/A 2016    Procedure: LASER REVOLIX / QUANTA PHOTOSELECTIVE VAPORIZATION PROSTATE;  Surgeon: Ryan Ruiz MD;  Location: [facility]    ORTHOPEDIC SURGERY      knee scopes bilateral    PROSTATE SURGERY         FAMILY HISTORY:  Family History   Problem Relation Age of Onset    Cancer Mother         Pancreatic    Cancer Father         Bladder    Cardiomyopathy Son        SOCIAL HISTORY:  Social History     Socioeconomic History    Marital status:    Tobacco Use    Smoking status: Former     Types: Cigarettes     Quit date:      Years since quittin.0    Smokeless tobacco: Never    Tobacco comments:     quit at age 21   Substance and Sexual Activity    Alcohol use: Yes     Comment: 2-3 beer day, [employer] light    Drug use: No    Sexual activity: Yes     Partners: Female   Other Topics Concern    Caffeine Concern No     Comment: 3 coffee in the mornings    Exercise Yes     Comment: walking/biking- 2 hours a day.       Review of Systems:  Skin:  Negative     "   Eyes:  Negative      ENT:  Negative      Respiratory:  Negative       Cardiovascular:  Negative for;palpitations;edema, no exertional chest discomfort, presyncope syncope  Gastroenterology: Negative      Genitourinary:  Negative      Musculoskeletal:  Negative      Neurologic:  Negative      Psychiatric:  Negative      Heme/Lymph/Imm:  Positive for allergies    Endocrine:  Negative        Physical Exam:  Vitals: /88 (BP Location: Right arm, Patient Position: Sitting, Cuff Size: Adult Large)   Pulse 64   Ht 1.765 m (5' 9.49\")   Wt 97.5 kg (215 lb)   SpO2 97%   BMI 31.31 kg/m      General patient appears comfortable  Neck normal JVP  Cardiovascular system S1-S2 normal no murmur rub or gallop  Respiratory system clear to auscultation  Extremities no edema  Neurological alert, oriented      CC  Milli Newberry, PA-C  [address]  [place] [address]                "

## 2024-02-05 DIAGNOSIS — I10 BENIGN ESSENTIAL HYPERTENSION: ICD-10-CM

## 2024-02-05 DIAGNOSIS — E78.5 HYPERLIPIDEMIA WITH TARGET LDL LESS THAN 70: ICD-10-CM

## 2024-02-05 RX ORDER — ROSUVASTATIN CALCIUM 40 MG/1
40 TABLET, COATED ORAL DAILY
Qty: 90 TABLET | Refills: 4 | Status: SHIPPED | OUTPATIENT
Start: 2024-02-05

## 2024-02-05 RX ORDER — AMLODIPINE BESYLATE 5 MG/1
5 TABLET ORAL DAILY
Qty: 90 TABLET | Refills: 4 | Status: SHIPPED | OUTPATIENT
Start: 2024-02-05

## 2024-02-05 RX ORDER — HYDROCHLOROTHIAZIDE 25 MG/1
25 TABLET ORAL DAILY
Qty: 90 TABLET | Refills: 4 | Status: SHIPPED | OUTPATIENT
Start: 2024-02-05

## 2024-02-05 RX ORDER — LOSARTAN POTASSIUM 100 MG/1
100 TABLET ORAL DAILY
Qty: 90 TABLET | Refills: 4 | Status: SHIPPED | OUTPATIENT
Start: 2024-02-05

## 2024-02-05 NOTE — TELEPHONE ENCOUNTER
[facility] Refill Guideline reviewed.  Medication meets criteria for refill.   
Deon Saab

## 2024-04-17 NOTE — LETTER
-- DO NOT REPLY / DO NOT REPLY ALL --  -- Message is from Engagement Center Operations (ECO) --    ONLY TO BE USED WITHIN A REFILL MEDICATION ENCOUNTER    Med Refill  Is the patient currently having any symptoms?: No/Non-Emergent symptoms    Name of medication requested: See pended med    Is this the first request for the medication in the last 48 hours?: Yes    Patient is requesting a medication refill - medication is on active medication list    Patient is currently OUT of the requested medication - sent as HIGH priority    Full name of the provider who ordered the medication: Cirilo MCKINNON, [facility] site name / Account # for provider: [facility] [address]     Preferred Pharmacy: Pharmacy  [id number] - [place], [place] - [facility] [address] [place] [address] At [facility]    Patient confirmed the above pharmacy as correct?  Yes    Caller Information         Type Contact Phone/Fax    04/17/2024 11:18 AM CDT Phone (Incoming) Ortiz Paige (Self) [phone] (M)            Alternative phone number: no    Can a detailed message be left?: Yes         1/10/2020      Remi Wyman MD  [address] [place] [address]      RE: Rufus Elena       Dear Colleague,    I had the pleasure of seeing Rufus Elena in the [facility].    Service Date: 01/10/2020      REASON FOR CLINIC VISIT:  Followup mildly dilated aortic root, hypertrophic cardiomyopathy, nonobstructive coronary artery disease.      HISTORY OF PRESENT ILLNESS:  ILLNESS:  Mr. Elena is a very pleasant 69-year-old gentleman with history of apical hypertrophy diagnosed more than 11 years ago in [place] with mildly dilated aortic root/ascending aorta measuring 3.9 cm, nonobstructive coronary artery disease on the basis of coronary CT angiogram in 2016.      Today he is coming for routine followup.  He had a repeat echocardiogram that showed stable size of aorta.  In terms of sudden cardiac death risk stratification in the setting of hypertrophic cardiomyopathy, the maximum LV thickness was 1.6 cm.  There was a small patchy delayed enhancement along the left ventricular apex.  No NSVT on Holter.  No personal history of syncope or family history of sudden cardiac death and normal blood pressure response to exercise.  To be noted, the patient is on a statin, high-intensity Lipitor 40.  He was also on baby aspirin but tells me that he stopped taking aspirin when the reports came earlier last year about no indication of aspirin for primary prevention.  He never had any side effects when he was on baby aspirin.  The patient tells me health-wise he feels quite well.  He is fairly active.  He does regular hiking, sometimes biking, a lot of walking, more so now that he is retired and has more time.  No chest discomfort or shortness of breath with physical activity.  No dizziness, presyncope or syncope.  The patient tells me that of late, he has noticed that sometimes his blood pressure recordings at home would be elevated in 150s systolic, diastolic 80s, but other times it  would be back in 130s to 120s systolic.  Today in the clinic, his initial systolic blood pressure was elevated to 150s, on repeat check his blood pressure was 128/84.      PHYSICAL EXAMINATION:   VITAL SIGNS:  Blood pressure 128/84, heart rate 58, regular, weight 212 pounds, BMI 31.15.   GENERAL:  The patient appears pleasant, comfortable.   NECK:  Normal JVP, no bruit.   CARDIOVASCULAR SYSTEM:  S1, S2 normal, no murmur, rub or gallop.   RESPIRATORY SYSTEM:  Clear to auscultation bilaterally.   GASTROINTESTINAL SYSTEM:  Abdomen soft, nontender.   EXTREMITIES:  No pitting pedal edema.   NEUROLOGICAL:  Alert, oriented x3.   PSYCH:  Normal affect.   SKIN:  No obvious rash.   HEENT:  No pallor or icterus.      IMPRESSION AND PLAN:  A very pleasant 69-year-old gentleman with history of apical hypertrophic cardiomyopathy, no major sudden cardiac death risk criteria, other comorbidities of nonobstructive coronary artery disease, mildly dilated aortic root.  Overall, cardiac status-wise, he is doing reasonably well.  The aorta size is stable on repeat echocardiogram with normal LV function.  Clinically, he does not appear to have any typical anginal symptoms.  I did discuss with the patient about the indication for aspirin, which in his case will be secondary prophylaxis given that he has evidence of mild nonobstructive coronary artery disease.  The patient tells me that he will resume baby aspirin, as he had no issues with it when he was on it.  He will continue statin.  If he continues to feel stable cardiac status-wise, we can see him back in 1 year, sooner if he notes any change in clinical status.  If patient notices that his blood pressure recordings at home are consistently high, I recommend that patient to follow with his primary care physician, Dr. Remi Wyman, for further evaluation of possible hypertension.      cc:   Remi Wyman MD    [facility]    [address] [place]   [address]         COLE BURCIAGA MD             D: 01/10/2020   T: 01/10/2020   MT: JOSÉ      Name:     TONI SWANN   MRN:      [id number]        Account:      [id number]   :      1950           Service Date: 01/10/2020      Document: [id number]         Outpatient Encounter Medications as of 1/10/2020   Medication Sig Dispense Refill     atorvastatin (LIPITOR) 40 MG tablet Take 1 tablet (40 mg) by mouth daily 90 tablet 3     loratadine (CLARITIN) 10 MG tablet Take 10 mg by mouth as needed for allergies       Multiple Vitamins-Minerals (PRESERVISION AREDS 2+MULTI VIT) CAPS Dose is unknown but takes 1 pill a day       [DISCONTINUED] aspirin 81 MG EC tablet Take 1 tablet (81 mg) by mouth daily 90 tablet 3     [DISCONTINUED] multivitamin, therapeutic with minerals (MULTI-VITAMIN) TABS tablet Take 1 tablet by mouth daily 100 tablet 3     [DISCONTINUED] sildenafil (VIAGRA) 100 MG tablet Take 0.5-1 tablets ( mg) by mouth daily as needed (erectile dysfunction) take 30 min to 4 hours before sexual activity 4 tablet 3     No facility-administered encounter medications on file as of 1/10/2020.        Again, thank you for allowing me to participate in the care of your patient.      Sincerely,    Cole Burciaga MD     [facility]

## 2024-09-07 ENCOUNTER — HEALTH MAINTENANCE LETTER (OUTPATIENT)
Age: 74
End: 2024-09-07

## 2025-02-11 ENCOUNTER — ALLIED HEALTH/NURSE VISIT (OUTPATIENT)
Dept: CARDIOLOGY | Facility: CLINIC | Age: 75
End: 2025-02-11
Attending: INTERNAL MEDICINE
Payer: COMMERCIAL

## 2025-02-11 DIAGNOSIS — I42.2 APICAL VARIANT HYPERTROPHIC CARDIOMYOPATHY (H): ICD-10-CM

## 2025-02-11 DIAGNOSIS — R00.1 BRADYCARDIA: ICD-10-CM

## 2025-02-11 DIAGNOSIS — R94.31 ABNORMAL ELECTROCARDIOGRAM: ICD-10-CM

## 2025-02-11 DIAGNOSIS — I42.2 APICAL VARIANT HYPERTROPHIC CARDIOMYOPATHY (H): Primary | ICD-10-CM

## 2025-02-11 PROCEDURE — 93242 EXT ECG>48HR<7D RECORDING: CPT | Performed by: INTERNAL MEDICINE

## 2025-02-11 NOTE — PROGRESS NOTES
Rufus Elena arrived here on 2/11/2025 2:56 PM for 3-7 Days  Zio monitor placement per ordering provider Cole Burciaga for the diagnosis Bradycaria.  Patient s skin was prepped per protocol. Dr. Franck Yoder is the supervising MD.  Zio monitor was placed.  Instructions were reviewed with and given to the patient.  Patient verbalized understanding of wear, troubleshooting and monitor return instructions.

## 2025-02-27 LAB — CV ZIO PRELIM RESULTS: NORMAL

## 2025-03-03 LAB — CV ZIO PRELIM RESULTS: NORMAL

## 2025-03-10 ENCOUNTER — OFFICE VISIT (OUTPATIENT)
Dept: CARDIOLOGY | Facility: CLINIC | Age: 75
End: 2025-03-10
Payer: COMMERCIAL

## 2025-03-10 VITALS
BODY MASS INDEX: 31.21 KG/M2 | SYSTOLIC BLOOD PRESSURE: 126 MMHG | DIASTOLIC BLOOD PRESSURE: 76 MMHG | HEART RATE: 37 BPM | OXYGEN SATURATION: 96 % | WEIGHT: 218 LBS | HEIGHT: 70 IN

## 2025-03-10 DIAGNOSIS — R00.1 BRADYCARDIA: Primary | ICD-10-CM

## 2025-03-10 DIAGNOSIS — I42.2 APICAL VARIANT HYPERTROPHIC CARDIOMYOPATHY (H): ICD-10-CM

## 2025-03-10 DIAGNOSIS — I49.3 PVC'S (PREMATURE VENTRICULAR CONTRACTIONS): ICD-10-CM

## 2025-03-10 LAB
ANION GAP SERPL CALCULATED.3IONS-SCNC: 14 MMOL/L (ref 7–15)
BUN SERPL-MCNC: 31.1 MG/DL (ref 8–23)
CALCIUM SERPL-MCNC: 9.5 MG/DL (ref 8.8–10.4)
CHLORIDE SERPL-SCNC: 103 MMOL/L (ref 98–107)
CHOLEST SERPL-MCNC: 172 MG/DL
CREAT SERPL-MCNC: 1.28 MG/DL (ref 0.67–1.17)
EGFRCR SERPLBLD CKD-EPI 2021: 59 ML/MIN/1.73M2
FASTING STATUS PATIENT QL REPORTED: YES
FASTING STATUS PATIENT QL REPORTED: YES
GLUCOSE SERPL-MCNC: 94 MG/DL (ref 70–99)
HCO3 SERPL-SCNC: 24 MMOL/L (ref 22–29)
HDLC SERPL-MCNC: 72 MG/DL
LDLC SERPL CALC-MCNC: 78 MG/DL
MAGNESIUM SERPL-MCNC: 2.1 MG/DL (ref 1.7–2.3)
NONHDLC SERPL-MCNC: 100 MG/DL
POTASSIUM SERPL-SCNC: 4.2 MMOL/L (ref 3.4–5.3)
SODIUM SERPL-SCNC: 141 MMOL/L (ref 135–145)
TRIGL SERPL-MCNC: 111 MG/DL

## 2025-03-10 PROCEDURE — 99215 OFFICE O/P EST HI 40 MIN: CPT | Performed by: INTERNAL MEDICINE

## 2025-03-10 PROCEDURE — G2211 COMPLEX E/M VISIT ADD ON: HCPCS | Performed by: INTERNAL MEDICINE

## 2025-03-10 PROCEDURE — 93000 ELECTROCARDIOGRAM COMPLETE: CPT | Performed by: INTERNAL MEDICINE

## 2025-03-10 PROCEDURE — 83735 ASSAY OF MAGNESIUM: CPT | Performed by: INTERNAL MEDICINE

## 2025-03-10 PROCEDURE — 80048 BASIC METABOLIC PNL TOTAL CA: CPT | Performed by: INTERNAL MEDICINE

## 2025-03-10 PROCEDURE — 3078F DIAST BP <80 MM HG: CPT | Performed by: INTERNAL MEDICINE

## 2025-03-10 PROCEDURE — 3074F SYST BP LT 130 MM HG: CPT | Performed by: INTERNAL MEDICINE

## 2025-03-10 PROCEDURE — 80061 LIPID PANEL: CPT | Performed by: INTERNAL MEDICINE

## 2025-03-10 PROCEDURE — 36415 COLL VENOUS BLD VENIPUNCTURE: CPT | Performed by: INTERNAL MEDICINE

## 2025-03-10 PROCEDURE — 1126F AMNT PAIN NOTED NONE PRSNT: CPT | Performed by: INTERNAL MEDICINE

## 2025-03-10 RX ORDER — METOPROLOL SUCCINATE 25 MG/1
25 TABLET, EXTENDED RELEASE ORAL DAILY
Qty: 90 TABLET | Refills: 3 | Status: SHIPPED | OUTPATIENT
Start: 2025-03-10

## 2025-03-10 ASSESSMENT — PAIN SCALES - GENERAL: PAINLEVEL_OUTOF10: NO PAIN (0)

## 2025-03-10 NOTE — PROGRESS NOTES
HPI and Plan:   Mr Elena is a very pleasant 75-year-old gentleman with history of apical hypertrophic cardiomyopathy diagnosed about 15 years ago in [place], cardiac MRI in 2016 confirming apical hypertrophic cardiomyopathy with maximal thickness of 1.6 cm and some patchy delayed enhancement, other comorbidities of hypertension, nonobstructive coronary disease diagnosed on coronary CT angiogram 2016.  Today is patient coming for routine follow-up.  Recently called our clinic because he noted his heart rate to be low.  He also had some dizziness episodes.  He had 3 days of Zio patch monitor that showed baseline rhythm to be sinus with average heart rate of 58 bpm maximum 169 bpm low of 43 bpm and there were frequent PVCs unifocal about 22%.  No NSVT noted.  At times ventricular bigeminy and trigeminy noted.  Patient tells me that he felt some dizzy episodes but he felt this was likely because of dehydration as he improved his hydration the episodes went away.  He did not have any symptoms while he was wearing Zio patch monitor.  No presyncope or syncope.  No chest discomfort or shortness of breath.  He has been fairly active exercising and riding bike without any issues.  In the past he was on both verapamil and metoprolol for blood pressure control but they were discontinued as it was interfering with his exercise capacity.  He had lipid panel done today that showed LDL at 78.  He is on baby aspirin, Crestor 40 mg daily.  Additionally he is on multiple antihypertensive medication with losartan 100 mg daily hydrochlorothiazide 25 mg daily, amlodipine 5 mg daily.  Blood pressure has been well-controlled at home.  To be noted his son has also been diagnosed with hypertrophic cardiomyopathy he is in 40s.  Patient does not use any tobacco.  1-2 beers a day which has been longstanding no recent increase.    Assessment and plan  Apical hypertrophic cardiomyopathy.  Maximal wall thickness 1.6 cm.  No personal history of  syncope.  No family history of sudden cardiac death.  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  Will combine with stress testing as he has history of nonobstructive CAD and now frequent PVCs.  Frequent PVCs.  Mostly unifocal.  Essentially asymptomatic from it.  Discussed option rational of using low-dose beta-blocker like Toprol-XL 25 mg daily.  Common side effects were discussed with patient.  His blood pressure is well-controlled.  Discussed with patient that if we notice significant drop in blood pressure we can decrease other antihypertensive medication.  At this time it looks like he should be fine with the simple addition of low-dose beta-blocker.  Recommend if he noted side effects from beta-blocker to discontinue it and let us know.  Otherwise I recommend repeating cardiac monitor with 48-hour Holter in about 1 month time to see if this has impact on PVC burden  Nonobstructive CAD.  Recommend cardiac MRI stress test.  Educated patient not to consume any caffeine for 12 asper for the stress test.  Continue high intensity statin, aspirin.  Hypertension controlled on multiple medications      Recommendations  Add Toprol-XL 25 mg daily  Cardiac MRI stress test  Also recommend checking BMP and magnesium to make sure potassium kidney function and magnesium are okay  48-hour Holter evaluation in about 1 month to see how much PVC burden has changed after beta-blocker therapy  Follow-up in about 2 months    45 minutes of total time spent today.    Orders Placed This Encounter   Procedures    Magnesium    Basic metabolic panel    Follow-Up with Cardiology    EKG 12-lead complete w/read - Clinics    Holter Monitor 48 hour Adult Pediatric       Orders Placed This Encounter   Medications    metoprolol succinate ER (TOPROL XL) 25 MG 24 hr tablet     Sig: Take 1 tablet (25 mg) by mouth daily.     Dispense:  90 tablet     Refill:  3       There are no discontinued  medications.      Encounter Diagnoses   Name Primary?    Apical variant hypertrophic cardiomyopathy (H)     Bradycardia Yes    PVC's (premature ventricular contractions)        CURRENT MEDICATIONS:  Current Outpatient Medications   Medication Sig Dispense Refill    amLODIPine (NORVASC) 5 MG tablet Take 1 tablet (5 mg) by mouth daily 90 tablet 4    aspirin (ASA) 81 MG EC tablet Take 81 mg by mouth daily      famotidine (PEPCID) 20 MG tablet Take 20 mg by mouth daily as needed      hydrochlorothiazide (HYDRODIURIL) 25 MG tablet Take 1 tablet (25 mg) by mouth daily 90 tablet 4    losartan (COZAAR) 100 MG tablet Take 1 tablet (100 mg) by mouth daily 90 tablet 4    metoprolol succinate ER (TOPROL XL) 25 MG 24 hr tablet Take 1 tablet (25 mg) by mouth daily. 90 tablet 3    Multiple Vitamins-Minerals (PRESERVISION AREDS 2+MULTI VIT) CAPS Dose is unknown but takes 1 pill a day      rosuvastatin (CRESTOR) 40 MG tablet Take 1 tablet (40 mg) by mouth daily 90 tablet 4    loratadine (CLARITIN) 10 MG tablet Take 10 mg by mouth as needed for allergies (Patient not taking: Reported on 3/10/2025)         ALLERGIES     Allergies   Allergen Reactions    Seasonal Allergies        PAST MEDICAL HISTORY:  Past Medical History:   Diagnosis Date    Benign non-nodular prostatic hyperplasia with lower urinary tract symptoms 11/9/2015    Hypertrophic cardiomyopathy (H)     Uncomplicated asthma     as child       PAST SURGICAL HISTORY:  Past Surgical History:   Procedure Laterality Date    LASER REVOLIX PHOTOSELECTIVE VAPORIZATION PROSTATE N/A 12/13/2016    Procedure: LASER REVOLIX / QUANTA PHOTOSELECTIVE VAPORIZATION PROSTATE;  Surgeon: Ryan Ruiz MD;  Location: [facility] OR    ORTHOPEDIC SURGERY      knee scopes bilateral    PROSTATE SURGERY         FAMILY HISTORY:  Family History   Problem Relation Age of Onset    Cancer Mother         Pancreatic    Cancer Father         Bladder    Cardiomyopathy Son        SOCIAL HISTORY:  Social  "History     Socioeconomic History    Marital status:      Spouse name: None    Number of children: None    Years of education: None    Highest education level: None   Tobacco Use    Smoking status: Former     Current packs/day: 0.00     Types: Cigarettes     Quit date:      Years since quittin.2    Smokeless tobacco: Never    Tobacco comments:     quit at age 21   Substance and Sexual Activity    Alcohol use: Yes     Comment: 2-3 beer day, [employer] light    Drug use: No    Sexual activity: Yes     Partners: Female   Other Topics Concern    Caffeine Concern No     Comment: 3 coffee in the mornings    Exercise Yes     Comment: walking/biking- 2 hours a day.     Social Drivers of Health      Received from [employer] & [employer]    Financial Resource Strain    Received from [employer], [employer]    Social Connections       Review of Systems:  Skin:          Eyes:         ENT:         Respiratory:  Negative for shortness of breath, dyspnea on exertion, cough, wheezing     Cardiovascular:  Negative for, palpitations, chest pain, edema, fatigue, lightheadedness, dizziness      Gastroenterology:        Genitourinary:         Musculoskeletal:         Neurologic:  Negative for headaches, numbness or tingling of hands, numbness or tingling of feet    Psychiatric:         Heme/Lymph/Imm:         Endocrine:           Physical Exam:  Vitals: /76 (BP Location: Left arm, Patient Position: Sitting, Cuff Size: Adult Regular)   Pulse (!) 37   Ht 1.778 m (5' 10\")   Wt 98.9 kg (218 lb)   SpO2 96%   BMI 31.28 kg/m      General Patient appears comfortable  Neck normal JVP  Cardiovascular system S1-S2 normal no murmur rub or gallop  Respite system clear to auscultation  Extremities no edema        CC  Williams Leung MD  [address] [place] [address]                    "

## 2025-03-10 NOTE — LETTER
3/10/2025    Williams Leung MD  [address]  [place] [address]    RE: Rufus Elena       Dear Colleague,     I had the pleasure of seeing Rufus Elena in the [facility].  HPI and Plan:   Mr Elena is a very pleasant 75-year-old gentleman with history of apical hypertrophic cardiomyopathy diagnosed about 15 years ago in [place], cardiac MRI in 2016 confirming apical hypertrophic cardiomyopathy with maximal thickness of 1.6 cm and some patchy delayed enhancement, other comorbidities of hypertension, nonobstructive coronary disease diagnosed on coronary CT angiogram 2016.  Today is patient coming for routine follow-up.  Recently called our clinic because he noted his heart rate to be low.  He also had some dizziness episodes.  He had 3 days of Zio patch monitor that showed baseline rhythm to be sinus with average heart rate of 58 bpm maximum 169 bpm low of 43 bpm and there were frequent PVCs unifocal about 22%.  No NSVT noted.  At times ventricular bigeminy and trigeminy noted.  Patient tells me that he felt some dizzy episodes but he felt this was likely because of dehydration as he improved his hydration the episodes went away.  He did not have any symptoms while he was wearing Zio patch monitor.  No presyncope or syncope.  No chest discomfort or shortness of breath.  He has been fairly active exercising and riding bike without any issues.  In the past he was on both verapamil and metoprolol for blood pressure control but they were discontinued as it was interfering with his exercise capacity.  He had lipid panel done today that showed LDL at 78.  He is on baby aspirin, Crestor 40 mg daily.  Additionally he is on multiple antihypertensive medication with losartan 100 mg daily hydrochlorothiazide 25 mg daily, amlodipine 5 mg daily.  Blood pressure has been well-controlled at home.  To be noted his son has also been diagnosed with hypertrophic cardiomyopathy he is in 40s.  Patient does not use  any tobacco.  1-2 beers a day which has been longstanding no recent increase.    Assessment and plan  Apical hypertrophic cardiomyopathy.  Maximal wall thickness 1.6 cm.  No personal history of syncope.  No family history of sudden cardiac death.  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  Will combine with stress testing as he has history of nonobstructive CAD and now frequent PVCs.  Frequent PVCs.  Mostly unifocal.  Essentially asymptomatic from it.  Discussed option rational of using low-dose beta-blocker like Toprol-XL 25 mg daily.  Common side effects were discussed with patient.  His blood pressure is well-controlled.  Discussed with patient that if we notice significant drop in blood pressure we can decrease other antihypertensive medication.  At this time it looks like he should be fine with the simple addition of low-dose beta-blocker.  Recommend if he noted side effects from beta-blocker to discontinue it and let us know.  Otherwise I recommend repeating cardiac monitor with 48-hour Holter in about 1 month time to see if this has impact on PVC burden  Nonobstructive CAD.  Recommend cardiac MRI stress test.  Educated patient not to consume any caffeine for 12 asper for the stress test.  Continue high intensity statin, aspirin.  Hypertension controlled on multiple medications      Recommendations  Add Toprol-XL 25 mg daily  Cardiac MRI stress test  Also recommend checking BMP and magnesium to make sure potassium kidney function and magnesium are okay  48-hour Holter evaluation in about 1 month to see how much PVC burden has changed after beta-blocker therapy  Follow-up in about 2 months    45 minutes of total time spent today.    Orders Placed This Encounter   Procedures     Magnesium     Basic metabolic panel     Follow-Up with Cardiology     EKG 12-lead complete w/read - Clinics     Holter Monitor 48 hour Adult Pediatric       Orders Placed This Encounter    Medications     metoprolol succinate ER (TOPROL XL) 25 MG 24 hr tablet     Sig: Take 1 tablet (25 mg) by mouth daily.     Dispense:  90 tablet     Refill:  3       There are no discontinued medications.      Encounter Diagnoses   Name Primary?     Apical variant hypertrophic cardiomyopathy (H)      Bradycardia Yes     PVC's (premature ventricular contractions)        CURRENT MEDICATIONS:  Current Outpatient Medications   Medication Sig Dispense Refill     amLODIPine (NORVASC) 5 MG tablet Take 1 tablet (5 mg) by mouth daily 90 tablet 4     aspirin (ASA) 81 MG EC tablet Take 81 mg by mouth daily       famotidine (PEPCID) 20 MG tablet Take 20 mg by mouth daily as needed       hydrochlorothiazide (HYDRODIURIL) 25 MG tablet Take 1 tablet (25 mg) by mouth daily 90 tablet 4     losartan (COZAAR) 100 MG tablet Take 1 tablet (100 mg) by mouth daily 90 tablet 4     metoprolol succinate ER (TOPROL XL) 25 MG 24 hr tablet Take 1 tablet (25 mg) by mouth daily. 90 tablet 3     Multiple Vitamins-Minerals (PRESERVISION AREDS 2+MULTI VIT) CAPS Dose is unknown but takes 1 pill a day       rosuvastatin (CRESTOR) 40 MG tablet Take 1 tablet (40 mg) by mouth daily 90 tablet 4     loratadine (CLARITIN) 10 MG tablet Take 10 mg by mouth as needed for allergies (Patient not taking: Reported on 3/10/2025)         ALLERGIES     Allergies   Allergen Reactions     Seasonal Allergies        PAST MEDICAL HISTORY:  Past Medical History:   Diagnosis Date     Benign non-nodular prostatic hyperplasia with lower urinary tract symptoms 11/9/2015     Hypertrophic cardiomyopathy (H)      Uncomplicated asthma     as child       PAST SURGICAL HISTORY:  Past Surgical History:   Procedure Laterality Date     LASER REVOLIX PHOTOSELECTIVE VAPORIZATION PROSTATE N/A 12/13/2016    Procedure: LASER REVOLIX / QUANTA PHOTOSELECTIVE VAPORIZATION PROSTATE;  Surgeon: Ryan Ruiz MD;  Location: [facility] OR     ORTHOPEDIC SURGERY      knee scopes bilateral      "PROSTATE SURGERY         FAMILY HISTORY:  Family History   Problem Relation Age of Onset     Cancer Mother         Pancreatic     Cancer Father         Bladder     Cardiomyopathy Son        SOCIAL HISTORY:  Social History     Socioeconomic History     Marital status:      Spouse name: None     Number of children: None     Years of education: None     Highest education level: None   Tobacco Use     Smoking status: Former     Current packs/day: 0.00     Types: Cigarettes     Quit date:      Years since quittin.2     Smokeless tobacco: Never     Tobacco comments:     quit at age 21   Substance and Sexual Activity     Alcohol use: Yes     Comment: 2-3 beer day, [employer] light     Drug use: No     Sexual activity: Yes     Partners: Female   Other Topics Concern     Caffeine Concern No     Comment: 3 coffee in the mornings     Exercise Yes     Comment: walking/biking- 2 hours a day.     Social Drivers of Health      Received from [employer]    Financial Resource Strain    Received from [employer]    Social Connections       Review of Systems:  Skin:          Eyes:         ENT:         Respiratory:  Negative for shortness of breath, dyspnea on exertion, cough, wheezing     Cardiovascular:  Negative for, palpitations, chest pain, edema, fatigue, lightheadedness, dizziness      Gastroenterology:        Genitourinary:         Musculoskeletal:         Neurologic:  Negative for headaches, numbness or tingling of hands, numbness or tingling of feet    Psychiatric:         Heme/Lymph/Imm:         Endocrine:           Physical Exam:  Vitals: /76 (BP Location: Left arm, Patient Position: Sitting, Cuff Size: Adult Regular)   Pulse (!) 37   Ht 1.778 m (5' 10\")   Wt 98.9 kg (218 lb)   SpO2 96%   BMI 31.28 kg/m      General Patient appears " comfortable  Neck normal JVP  Cardiovascular system S1-S2 normal no murmur rub or gallop  Respite system clear to auscultation  Extremities no edema        CC  Williams Leung MD  [address] [place] [address]                      Thank you for allowing me to participate in the care of your patient.      Sincerely,     Cole Burciaga MD     [facility]  cc:   Williams Leung [place]  [address] [place] [address]

## 2025-03-11 ENCOUNTER — TELEPHONE (OUTPATIENT)
Dept: CARDIOLOGY | Facility: CLINIC | Age: 75
End: 2025-03-11
Payer: MEDICARE

## 2025-03-11 DIAGNOSIS — I42.2 APICAL VARIANT HYPERTROPHIC CARDIOMYOPATHY (H): Primary | ICD-10-CM

## 2025-03-11 DIAGNOSIS — I10 BENIGN ESSENTIAL HYPERTENSION: ICD-10-CM

## 2025-03-11 DIAGNOSIS — I49.3 PVC'S (PREMATURE VENTRICULAR CONTRACTIONS): ICD-10-CM

## 2025-03-11 NOTE — TELEPHONE ENCOUNTER
Team received report for labs today.  Routing to Dr. Burciaga to review.    GFR  59,   creat  1.28,  BUN  31.1    Manasa Brizuela RN on 3/11/2025 at 9:26 AM

## 2025-03-11 NOTE — TELEPHONE ENCOUNTER
"Received recommendation from Dr. Burciaga, after he reviewed the lab results.    Writer placed call to Darnell, instructed him to increase water intake by two 10 ounce (or so) glasses of water per day, since kidney function labs show stress to kidney and appear to be dehydration related.    Darnell readily admits he does not drink enough liquids.  He states he is a good exerciser, and does push hard, sweats a lot while exercising, but does not do a good job at replacing fluids.   He is aware, states he \"knew as soon as I saw the lab results\", and he is already making plans to really try harder.    Instructed him to schedule lab only apmnt on the same day he comes in for his MRI.      Darnell verbalized understanding and agreement with the plan.      Manasa Brizuela RN, BSN                "

## 2025-03-28 ENCOUNTER — HOSPITAL ENCOUNTER (OUTPATIENT)
Dept: CARDIOLOGY | Facility: CLINIC | Age: 75
Discharge: HOME | End: 2025-03-28
Attending: INTERNAL MEDICINE | Admitting: INTERNAL MEDICINE
Payer: MEDICARE

## 2025-03-28 VITALS — HEART RATE: 61 BPM | DIASTOLIC BLOOD PRESSURE: 61 MMHG | SYSTOLIC BLOOD PRESSURE: 115 MMHG

## 2025-03-28 DIAGNOSIS — I42.2 APICAL VARIANT HYPERTROPHIC CARDIOMYOPATHY (H): ICD-10-CM

## 2025-03-28 PROCEDURE — 93016 CV STRESS TEST SUPVJ ONLY: CPT | Performed by: INTERNAL MEDICINE

## 2025-03-28 PROCEDURE — 250N000011 HC RX IP 250 OP 636: Performed by: INTERNAL MEDICINE

## 2025-03-28 PROCEDURE — 75563 CARD MRI W/STRESS IMG & DYE: CPT | Mod: 26 | Performed by: INTERNAL MEDICINE

## 2025-03-28 PROCEDURE — A9585 GADOBUTROL INJECTION: HCPCS | Performed by: INTERNAL MEDICINE

## 2025-03-28 PROCEDURE — 255N000002 HC RX 255 OP 636: Performed by: INTERNAL MEDICINE

## 2025-03-28 PROCEDURE — 75563 CARD MRI W/STRESS IMG & DYE: CPT

## 2025-03-28 PROCEDURE — 93018 CV STRESS TEST I&R ONLY: CPT | Performed by: INTERNAL MEDICINE

## 2025-03-28 RX ORDER — REGADENOSON 0.08 MG/ML
0.4 INJECTION, SOLUTION INTRAVENOUS ONCE
Status: COMPLETED | OUTPATIENT
Start: 2025-03-28 | End: 2025-03-28

## 2025-03-28 RX ORDER — ALBUTEROL SULFATE 90 UG/1
2 INHALANT RESPIRATORY (INHALATION) EVERY 5 MIN PRN
Status: DISCONTINUED | OUTPATIENT
Start: 2025-03-28 | End: 2025-03-29 | Stop reason: HOSPADM

## 2025-03-28 RX ORDER — GADOBUTROL 604.72 MG/ML
26 INJECTION INTRAVENOUS ONCE
Status: COMPLETED | OUTPATIENT
Start: 2025-03-28 | End: 2025-03-28

## 2025-03-28 RX ORDER — LORAZEPAM 0.5 MG/1
0.5 TABLET ORAL EVERY 30 MIN PRN
Status: DISCONTINUED | OUTPATIENT
Start: 2025-03-28 | End: 2025-03-29 | Stop reason: HOSPADM

## 2025-03-28 RX ORDER — DIAZEPAM 5 MG/1
5 TABLET ORAL EVERY 30 MIN PRN
Status: DISCONTINUED | OUTPATIENT
Start: 2025-03-28 | End: 2025-03-29 | Stop reason: HOSPADM

## 2025-03-28 RX ORDER — CAFFEINE CITRATE 20 MG/ML
60 SOLUTION INTRAVENOUS
Status: ACTIVE | OUTPATIENT
Start: 2025-03-28 | End: 2025-03-28

## 2025-03-28 RX ORDER — CAFFEINE 200 MG
200 TABLET ORAL
Status: ACTIVE | OUTPATIENT
Start: 2025-03-28 | End: 2025-03-28

## 2025-03-28 RX ORDER — LIDOCAINE 40 MG/G
CREAM TOPICAL
Status: DISCONTINUED | OUTPATIENT
Start: 2025-03-28 | End: 2025-03-29 | Stop reason: HOSPADM

## 2025-03-28 RX ORDER — AMINOPHYLLINE 25 MG/ML
50-100 INJECTION, SOLUTION INTRAVENOUS
Status: DISCONTINUED | OUTPATIENT
Start: 2025-03-28 | End: 2025-03-29 | Stop reason: HOSPADM

## 2025-03-28 RX ORDER — SODIUM CHLORIDE 9 MG/ML
INJECTION, SOLUTION INTRAVENOUS CONTINUOUS PRN
Status: ACTIVE | OUTPATIENT
Start: 2025-03-28 | End: 2025-03-28

## 2025-03-28 RX ORDER — NITROGLYCERIN 0.4 MG/1
0.4 TABLET SUBLINGUAL EVERY 5 MIN PRN
Status: ACTIVE | OUTPATIENT
Start: 2025-03-28 | End: 2025-03-28

## 2025-03-28 RX ADMIN — REGADENOSON 0.4 MG: 0.08 INJECTION, SOLUTION INTRAVENOUS at 12:31

## 2025-03-28 RX ADMIN — GADOBUTROL 26 ML: 604.72 INJECTION INTRAVENOUS at 13:08

## 2025-03-28 NOTE — PROVIDER NOTIFICATION
MD Notification    Notified Person: MD    Notified Person Name: Avelino    Notification Date/Time: 3/28/25 1100    Notification Interaction: In person    Purpose of Notification: Abnormal EKG ok to proceed with MR    Orders Received:    Comments:

## 2025-04-10 ENCOUNTER — ALLIED HEALTH/NURSE VISIT (OUTPATIENT)
Dept: CARDIOLOGY | Facility: CLINIC | Age: 75
End: 2025-04-10
Attending: INTERNAL MEDICINE
Payer: MEDICARE

## 2025-04-10 DIAGNOSIS — I42.2 APICAL VARIANT HYPERTROPHIC CARDIOMYOPATHY (H): ICD-10-CM

## 2025-04-10 DIAGNOSIS — R00.1 BRADYCARDIA: ICD-10-CM

## 2025-04-10 DIAGNOSIS — I49.3 PVC'S (PREMATURE VENTRICULAR CONTRACTIONS): ICD-10-CM

## 2025-04-10 NOTE — PROGRESS NOTES
Rufus Elena arrived here on 4/10/2025 2:15 PM for 3-7 Days  Zio monitor placement per ordering provider Cole Burciaga for the diagnosis PVC's.  Patient s skin was prepped per protocol. Dr. Yoder is the supervising MD.  Zio monitor was placed.  Instructions were reviewed with and given to the patient.  Patient verbalized understanding of wear, troubleshooting and monitor return instructions.

## 2025-04-26 LAB — CV ZIO PRELIM RESULTS: NORMAL

## 2025-04-28 ENCOUNTER — TELEPHONE (OUTPATIENT)
Dept: CARDIOLOGY | Facility: CLINIC | Age: 75
End: 2025-04-28
Payer: MEDICARE

## 2025-04-28 NOTE — TELEPHONE ENCOUNTER
Patient called Team # asking for appointment for labs prior to upcoming OV 5/1/2025  Called patient back, left message to call scheduling to set up appointment for lab prior to visit. Patient traveling from [place]. If unable to have completed beforehand, will need drawn after appointment  Also advised on message that if [facility] nearby at patient's home, can have drawn prior to coming     Bobbi Zhu RN on 4/28/2025 at 3:56 PM

## 2025-04-29 ENCOUNTER — TELEPHONE (OUTPATIENT)
Dept: CARDIOLOGY | Facility: CLINIC | Age: 75
End: 2025-04-29
Payer: MEDICARE

## 2025-04-29 NOTE — TELEPHONE ENCOUNTER
[facility] Call Center    Phone Message    May a detailed message be left on voicemail: yes     Reason for Call: Other: Pt is checking to see if a lipid panel was needed or any other labs.  Currently there is a BMP order.  Pt is getting completed in [place] on the way to [facility] Thursday.     Action Taken: Other: cardio    Travel Screening: Not Applicable     Date of Service:

## 2025-04-30 NOTE — PROGRESS NOTES
Cardiology Clinic Progress Note  Rufus Elena MRN# [id number]   YOB: 1950 Age: 75 year old   Primary Cardiologist: Dr. Burciaga  Reason for visit: Follow up testing             Assessment and Plan:       1.  Apical hypertrophic cardiomyopathy  - Confirmed on MRI in 2016, repeat stress MRI done 3/2025 with stable findings of mild mid myocardial patchy heterogeneous fibrosis in the apical segments and apical left ventricular hypertrophy measuring 1.5 cm    2.  Frequent PVCs  -2/2025 Zio patch monitor noting 22% burden with episodes of bigeminy and trigeminy  - 3/2025 Zio patch monitor showing improved PVC burden down to 16% again with bigeminy and trigeminy    3.  Nonobstructive coronary artery disease  -2016 CT coronary angiogram showing a total calcium score of 413, placing patient in the 80th percentile with mid LAD less than 25 percent calcified lesion and trivial plaque in the circumflex and RCA  -No anginal symptoms     4.  Hypertension, uncontrolled    5.  NSVT  - 3 episodes noted on 3/2025 Zio patch monitor, up to 7 beats, during which patient was asymptomatic    6.  ANABELLE on CKD stage II  - Baseline renal function with creatinine 1.1, recently up to 1.2, stable on repeat lab work today at 1.27    Darnell has not had any further episodes of dizziness, specifically he had none while wearing both of his heart monitors.  We reviewed the results of his recent cardiac MRI which showed stable findings.  Given ongoing hypertension and the findings of NSVT, continued high PVC burden as well as multiple episodes of SVT, will increase metoprolol XL to 50 mg daily.  Caution patient to watch closely for any signs of low pulse including worsening dizziness or lightheadedness and recommended he be checking his blood pressures at home.  Given this mild elevation in creatinine I am going to reduce his HCTZ to 12.5 mg daily repeat his labs when he comes in for an appointment with electrophysiology in the near  future.  On his Zio patch monitor he did have 3 runs of NSVT which is a new finding and I would like him to see electrophysiology to discuss whether he is an appropriate candidate for an ICD implantation.     Plan:  Referral placed for patient to see electrophysiology to discuss ICD implantation for primary prevention  Recheck BMP on the date of next appointment  Reduce HCTZ to 12.5 mg daily  Increase metoprolol XL to 50 mg daily    Follow up: Follow-up with EP in the next 3 to 4 weeks        History of Presenting Illness:    Rufus Elena is a very pleasant 75 year old male with a history of apical hypertrophic cardiomyopathy diagnosed about 15 years ago in [place].  He had a cardiac MRI in 2016 which confirmed this showing a maximal thickness of 1.6 cm and some patchy delayed enhancement.  He also carries comorbidities of hypertension, nonobstructive coronary artery disease (diagnosed on coronary CT angiogram in 2016).    He was seen by Dr. Burciaga in March 2025 for routine follow-up, however was experiencing symptoms of dizziness.  He had a 3-day Zio patch monitor prior to that visit which showed baseline rhythm be sinus with an average heart rate of 58 bpm and maximum of 169 bpm normal 43 bpm.  He did have a 22% PVC burden with episodes of bigeminy and trigeminy.  He told him at that time he felt his episodes of dizziness were secondary to dehydration and improved following more adequate hydration.  We started him on low-dose metoprolol and recommended a repeat cardiac MRI with stress.    His cardiac MRI showed apical hypertrophic cardiomyopathy with maximum thickness of 1.5 cm, hyperdynamic left ventricular systolic function with an ejection fraction of 71%, no regional wall motion abnormalities, no left ventricular apical aneurysm, normal right ventricular size and function, no significant valvular disease, mild mid myocardial patchy heterogeneous fibrosis in the apical segments, however the quantification of  percent was challenging due to poor image quality.    He wore repeat 3-day Zio patch monitor which showed frequent PVCs of 16% and 3 VT runs, up to 7 beats with occasional PACs and 32 runs of SVT up to 13 beats.  Lowest heart rate of 44 bpm occurring during hours of sleep.    Patient is here today for a follow-up with his recent testing    Patient reports feeling good.  He remains quite active, hiking and exercising routinely.  He notes he had no symptoms while wearing his monitor.  He has not had any further dizzy spells.  He does not check his heart rate regularly.    Patient denies chest pain or chest tightness. Denies tachycardia or palpitations.  Denies orthopnea, lower extremity edema, or PND.    Most recent labs from today show sodium 136, potassium 4.3, BUN 31, creatinine 1.27, GFR 60.     Blood pressure 146/85 and HR 62 in clinic today.        Recent Hospitalizations   None in           Social History      Social History     Socioeconomic History    Marital status:      Spouse name: Not on file    Number of children: Not on file    Years of education: Not on file    Highest education level: Not on file   Occupational History    Not on file   Tobacco Use    Smoking status: Former     Current packs/day: 0.00     Types: Cigarettes     Quit date:      Years since quittin.3    Smokeless tobacco: Never    Tobacco comments:     quit at age 21   Substance and Sexual Activity    Alcohol use: Yes     Comment: 2-3 beer day, [employer] light    Drug use: No    Sexual activity: Yes     Partners: Female   Other Topics Concern    Parent/sibling w/ CABG, MI or angioplasty before 65F 55M? Not Asked     Service Not Asked    Blood Transfusions Not Asked    Caffeine Concern No     Comment: 3 coffee in the mornings    Occupational Exposure Not Asked    Hobby Hazards Not Asked    Sleep Concern Not Asked    Stress Concern Not Asked    Weight Concern Not Asked    Special Diet Not Asked    Back Care Not Asked  "   Exercise Yes     Comment: walking/biking- 2 hours a day.    Bike Helmet Not Asked    Seat Belt Not Asked    Self-Exams Not Asked   Social History Narrative    Not on file     Social Drivers of Health     Financial Resource Strain: High Risk (1/1/2022)    Received from [facility], [facility]    Financial Resource Strain     Difficulty of Paying Living Expenses: Not on file     Difficulty of Paying Living Expenses: Not on file   Food Insecurity: Not on file   Transportation Needs: Not on file   Physical Activity: Not on file   Stress: Not on file   Social Connections: Unknown (1/1/2022)    Received from [facility], [facility]    Social Connections     Frequency of Communication with Friends and Family: Not on file   Interpersonal Safety: Not on file   Housing Stability: Not on file            Review of Systems:   Skin:        Eyes:       ENT:       Respiratory:  Negative    Cardiovascular:  Negative for, palpitations, chest pain, edema, fatigue, lightheadedness, dizziness    Gastroenterology:      Genitourinary:       Musculoskeletal:       Neurologic:  Negative for headaches, numbness or tingling of hands, numbness or tingling of feet  Psychiatric:       Heme/Lymph/Imm:       Endocrine:              Physical Exam:   Vitals: BP (!) 146/85 (BP Location: Right arm, Patient Position: Sitting)   Pulse 62   Ht 1.753 m (5' 9\")   Wt 99.3 kg (219 lb)   SpO2 96%   BMI 32.34 kg/m     Wt Readings from Last 4 Encounters:   05/01/25 99.3 kg (219 lb)   03/10/25 98.9 kg (218 lb)   01/11/24 97.5 kg (215 lb)   11/17/22 98.9 kg (218 lb)     GEN: well nourished, in no acute distress.  HEENT:  Pupils equal, round. Sclerae nonicteric.   NECK: Supple, no masses appreciated.   C/V:  Regular rate and rhythm, no murmur, rub or gallop.    RESP: Respirations are unlabored. Clear to auscultation " "bilaterally without wheezing, rales, or rhonchi.  GI: Abdomen soft, nontender.  EXTREM: No LE edema.  NEURO: Alert and oriented, cooperative.  SKIN: Warm and dry.        Data:     LIPID RESULTS:  Lab Results   Component Value Date    CHOL 172 03/10/2025    CHOL 161 12/18/2018    HDL 72 03/10/2025    HDL 68 12/18/2018    LDL 78 03/10/2025    LDL 83 12/18/2018    TRIG 111 03/10/2025    TRIG 99 02/28/2023    TRIG 48 12/18/2018    CHOLHDLRATIO 3.7 11/09/2015     LIVER ENZYME RESULTS:  Lab Results   Component Value Date    AST 32 05/18/2020    ALT 23 01/11/2024    ALT 31 05/18/2020     CBC RESULTS:  No results found for: \"WBC\", \"RBC\", \"HGB\", \"HCT\", \"MCV\", \"MCH\", \"MCHC\", \"RDW\", \"PLT\"  BMP RESULTS:  Lab Results   Component Value Date     05/01/2025     12/18/2018    POTASSIUM 4.3 05/01/2025    POTASSIUM 4.1 11/17/2022    POTASSIUM 4.3 12/18/2018    CHLORIDE 99 05/01/2025    CHLORIDE 106 11/17/2022    CHLORIDE 108 12/18/2018    CO2 28 05/01/2025    CO2 25 11/17/2022    CO2 27 12/18/2018    ANIONGAP 9 05/01/2025    ANIONGAP 6 11/17/2022    ANIONGAP 4 12/18/2018     (H) 05/01/2025    GLC 97 11/17/2022     (H) 12/18/2018    BUN 31.0 (H) 05/01/2025    BUN 28 11/17/2022    BUN 19 12/18/2018    CR 1.27 (H) 05/01/2025    CR 1.03 12/18/2018    GFRESTIMATED 60 (L) 05/01/2025    GFRESTIMATED 72 12/18/2018    GFRESTBLACK 87 12/18/2018    GARRISON 10.0 05/01/2025    GARRISON 8.8 12/18/2018      A1C RESULTS:  No results found for: \"A1C\"  INR RESULTS:  No results found for: \"INR\"         Medications     Current Outpatient Medications   Medication Sig Dispense Refill    amLODIPine (NORVASC) 5 MG tablet Take 1 tablet (5 mg) by mouth daily 90 tablet 4    aspirin (ASA) 81 MG EC tablet Take 81 mg by mouth daily      famotidine (PEPCID) 20 MG tablet Take 20 mg by mouth daily as needed      hydrochlorothiazide 12.5 MG tablet Take 1 tablet (12.5 mg) by mouth daily. 90 tablet 3    loratadine (CLARITIN) 10 MG tablet Take 10 mg by " mouth as needed for allergies.      losartan (COZAAR) 100 MG tablet Take 1 tablet (100 mg) by mouth daily 90 tablet 4    metoprolol succinate ER (TOPROL XL) 50 MG 24 hr tablet Take 1 tablet (50 mg) by mouth daily. 90 tablet 3    Multiple Vitamins-Minerals (PRESERVISION AREDS 2+MULTI VIT) CAPS Dose is unknown but takes 1 pill a day      rosuvastatin (CRESTOR) 40 MG tablet Take 1 tablet (40 mg) by mouth daily 90 tablet 4          Past Medical History     Past Medical History:   Diagnosis Date    Benign non-nodular prostatic hyperplasia with lower urinary tract symptoms 11/9/2015    Hypertrophic cardiomyopathy (H)     Uncomplicated asthma     as child     Past Surgical History:   Procedure Laterality Date    LASER REVOLIX PHOTOSELECTIVE VAPORIZATION PROSTATE N/A 12/13/2016    Procedure: LASER REVOLIX / QUANTA PHOTOSELECTIVE VAPORIZATION PROSTATE;  Surgeon: Ryan Ruiz MD;  Location: [facility] OR    ORTHOPEDIC SURGERY      knee scopes bilateral    PROSTATE SURGERY       Family History   Problem Relation Age of Onset    Cancer Mother         Pancreatic    Cancer Father         Bladder    Cardiomyopathy Son             Allergies   Seasonal allergies        Brie Shore NP  [facility]

## 2025-05-01 ENCOUNTER — LAB (OUTPATIENT)
Dept: LAB | Facility: CLINIC | Age: 75
End: 2025-05-01
Payer: MEDICARE

## 2025-05-01 ENCOUNTER — OFFICE VISIT (OUTPATIENT)
Dept: CARDIOLOGY | Facility: CLINIC | Age: 75
End: 2025-05-01
Payer: MEDICARE

## 2025-05-01 VITALS
HEART RATE: 62 BPM | DIASTOLIC BLOOD PRESSURE: 85 MMHG | BODY MASS INDEX: 32.44 KG/M2 | SYSTOLIC BLOOD PRESSURE: 146 MMHG | WEIGHT: 219 LBS | HEIGHT: 69 IN | OXYGEN SATURATION: 96 %

## 2025-05-01 DIAGNOSIS — I10 BENIGN ESSENTIAL HYPERTENSION: ICD-10-CM

## 2025-05-01 DIAGNOSIS — I42.2 APICAL VARIANT HYPERTROPHIC CARDIOMYOPATHY (H): Primary | ICD-10-CM

## 2025-05-01 DIAGNOSIS — I47.29 NSVT (NONSUSTAINED VENTRICULAR TACHYCARDIA) (H): ICD-10-CM

## 2025-05-01 DIAGNOSIS — I49.3 PVC'S (PREMATURE VENTRICULAR CONTRACTIONS): ICD-10-CM

## 2025-05-01 DIAGNOSIS — I42.2 APICAL VARIANT HYPERTROPHIC CARDIOMYOPATHY (H): ICD-10-CM

## 2025-05-01 LAB
ANION GAP SERPL CALCULATED.3IONS-SCNC: 9 MMOL/L (ref 7–15)
BUN SERPL-MCNC: 31 MG/DL (ref 8–23)
CALCIUM SERPL-MCNC: 10 MG/DL (ref 8.8–10.4)
CHLORIDE SERPL-SCNC: 99 MMOL/L (ref 98–107)
CREAT SERPL-MCNC: 1.27 MG/DL (ref 0.67–1.17)
EGFRCR SERPLBLD CKD-EPI 2021: 60 ML/MIN/1.73M2
GLUCOSE SERPL-MCNC: 110 MG/DL (ref 70–99)
HCO3 SERPL-SCNC: 28 MMOL/L (ref 22–29)
POTASSIUM SERPL-SCNC: 4.3 MMOL/L (ref 3.4–5.3)
SODIUM SERPL-SCNC: 136 MMOL/L (ref 135–145)

## 2025-05-01 RX ORDER — METOPROLOL SUCCINATE 50 MG/1
50 TABLET, EXTENDED RELEASE ORAL DAILY
Qty: 90 TABLET | Refills: 3 | Status: SHIPPED | OUTPATIENT
Start: 2025-05-01

## 2025-05-01 RX ORDER — HYDROCHLOROTHIAZIDE 12.5 MG/1
12.5 TABLET ORAL DAILY
Qty: 90 TABLET | Refills: 3 | Status: SHIPPED | OUTPATIENT
Start: 2025-05-01

## 2025-05-01 NOTE — LETTER
5/1/2025    Williams Leung MD  [address] [place] [address]    RE: Rufus Elena       Dear Colleague,     I had the pleasure of seeing Rufus Elena in the [facility].    Cardiology Clinic Progress Note  Rufus Elena MRN# [id number]   YOB: 1950 Age: 75 year old   Primary Cardiologist: Dr. Burciaga  Reason for visit: Follow up testing             Assessment and Plan:       1.  Apical hypertrophic cardiomyopathy  - Confirmed on MRI in 2016, repeat stress MRI done 3/2025 with stable findings of mild mid myocardial patchy heterogeneous fibrosis in the apical segments and apical left ventricular hypertrophy measuring 1.5 cm    2.  Frequent PVCs  -2/2025 Zio patch monitor noting 22% burden with episodes of bigeminy and trigeminy  - 3/2025 Zio patch monitor showing improved PVC burden down to 16% again with bigeminy and trigeminy    3.  Nonobstructive coronary artery disease  -2016 CT coronary angiogram showing a total calcium score of 413, placing patient in the 80th percentile with mid LAD less than 25 percent calcified lesion and trivial plaque in the circumflex and RCA  -No anginal symptoms     4.  Hypertension, uncontrolled    5.  NSVT  - 3 episodes noted on 3/2025 Zio patch monitor, up to 7 beats, during which patient was asymptomatic    6.  ANABELLE on CKD stage II  - Baseline renal function with creatinine 1.1, recently up to 1.2, stable on repeat lab work today at 1.27    Darnell has not had any further episodes of dizziness, specifically he had none while wearing both of his heart monitors.  We reviewed the results of his recent cardiac MRI which showed stable findings.  Given ongoing hypertension and the findings of NSVT, continued high PVC burden as well as multiple episodes of SVT, will increase metoprolol XL to 50 mg daily.  Caution patient to watch closely for any signs of low pulse including worsening dizziness or lightheadedness and recommended he be checking his blood  pressures at home.  Given this mild elevation in creatinine I am going to reduce his HCTZ to 12.5 mg daily repeat his labs when he comes in for an appointment with electrophysiology in the near future.  On his Zio patch monitor he did have 3 runs of NSVT which is a new finding and I would like him to see electrophysiology to discuss whether he is an appropriate candidate for an ICD implantation.     Plan:  Referral placed for patient to see electrophysiology to discuss ICD implantation for primary prevention  Recheck BMP on the date of next appointment  Reduce HCTZ to 12.5 mg daily  Increase metoprolol XL to 50 mg daily    Follow up: Follow-up with EP in the next 3 to 4 weeks        History of Presenting Illness:    Rufus Elena is a very pleasant 75 year old male with a history of apical hypertrophic cardiomyopathy diagnosed about 15 years ago in [place].  He had a cardiac MRI in 2016 which confirmed this showing a maximal thickness of 1.6 cm and some patchy delayed enhancement.  He also carries comorbidities of hypertension, nonobstructive coronary artery disease (diagnosed on coronary CT angiogram in 2016).    He was seen by Dr. Burciaga in March 2025 for routine follow-up, however was experiencing symptoms of dizziness.  He had a 3-day Zio patch monitor prior to that visit which showed baseline rhythm be sinus with an average heart rate of 58 bpm and maximum of 169 bpm normal 43 bpm.  He did have a 22% PVC burden with episodes of bigeminy and trigeminy.  He told him at that time he felt his episodes of dizziness were secondary to dehydration and improved following more adequate hydration.  We started him on low-dose metoprolol and recommended a repeat cardiac MRI with stress.    His cardiac MRI showed apical hypertrophic cardiomyopathy with maximum thickness of 1.5 cm, hyperdynamic left ventricular systolic function with an ejection fraction of 71%, no regional wall motion abnormalities, no left ventricular apical  aneurysm, normal right ventricular size and function, no significant valvular disease, mild mid myocardial patchy heterogeneous fibrosis in the apical segments, however the quantification of percent was challenging due to poor image quality.    He wore repeat 3-day Zio patch monitor which showed frequent PVCs of 16% and 3 VT runs, up to 7 beats with occasional PACs and 32 runs of SVT up to 13 beats.  Lowest heart rate of 44 bpm occurring during hours of sleep.    Patient is here today for a follow-up with his recent testing    Patient reports feeling good.  He remains quite active, hiking and exercising routinely.  He notes he had no symptoms while wearing his monitor.  He has not had any further dizzy spells.  He does not check his heart rate regularly.    Patient denies chest pain or chest tightness. Denies tachycardia or palpitations.  Denies orthopnea, lower extremity edema, or PND.    Most recent labs from today show sodium 136, potassium 4.3, BUN 31, creatinine 1.27, GFR 60.     Blood pressure 146/85 and HR 62 in clinic today.        Recent Hospitalizations   None in           Social History      Social History     Socioeconomic History     Marital status:      Spouse name: Not on file     Number of children: Not on file     Years of education: Not on file     Highest education level: Not on file   Occupational History     Not on file   Tobacco Use     Smoking status: Former     Current packs/day: 0.00     Types: Cigarettes     Quit date:      Years since quittin.3     Smokeless tobacco: Never     Tobacco comments:     quit at age 21   Substance and Sexual Activity     Alcohol use: Yes     Comment: 2-3 beer day, [employer] light     Drug use: No     Sexual activity: Yes     Partners: Female   Other Topics Concern     Parent/sibling w/ CABG, MI or angioplasty before 65F 55M? Not Asked      Service Not Asked     Blood Transfusions Not Asked     Caffeine Concern No     Comment: 3 coffee in  "the mornings     Occupational Exposure Not Asked     Hobby Hazards Not Asked     Sleep Concern Not Asked     Stress Concern Not Asked     Weight Concern Not Asked     Special Diet Not Asked     Back Care Not Asked     Exercise Yes     Comment: walking/biking- 2 hours a day.     Bike Helmet Not Asked     Seat Belt Not Asked     Self-Exams Not Asked   Social History Narrative     Not on file     Social Drivers of Health     Financial Resource Strain: High Risk (1/1/2022)    [facility]    Financial Resource Strain      Difficulty of Paying Living Expenses: Not on file      Difficulty of Paying Living Expenses: Not on file   Food Insecurity: Not on file   Transportation Needs: Not on file   Physical Activity: Not on file   Stress: Not on file   Social Connections: Unknown (1/1/2022)    [facility]    Social Connections      Frequency of Communication with Friends and Family: Not on file   Interpersonal Safety: Not on file   Housing Stability: Not on file            Review of Systems:   Skin:        Eyes:       ENT:       Respiratory:  Negative    Cardiovascular:  Negative for, palpitations, chest pain, edema, fatigue, lightheadedness, dizziness    Gastroenterology:      Genitourinary:       Musculoskeletal:       Neurologic:  Negative for headaches, numbness or tingling of hands, numbness or tingling of feet  Psychiatric:       Heme/Lymph/Imm:       Endocrine:              Physical Exam:   Vitals: BP (!) 146/85 (BP Location: Right arm, Patient Position: Sitting)   Pulse 62   Ht 1.753 m (5' 9\")   Wt 99.3 kg (219 lb)   SpO2 96%   BMI 32.34 kg/m     Wt Readings from Last 4 Encounters:   05/01/25 99.3 kg (219 lb)   03/10/25 98.9 kg (218 lb)   01/11/24 97.5 kg (215 lb)   11/17/22 98.9 kg (218 lb)     GEN: well nourished, in no acute " "distress.  HEENT:  Pupils equal, round. Sclerae nonicteric.   NECK: Supple, no masses appreciated.   C/V:  Regular rate and rhythm, no murmur, rub or gallop.    RESP: Respirations are unlabored. Clear to auscultation bilaterally without wheezing, rales, or rhonchi.  GI: Abdomen soft, nontender.  EXTREM: No LE edema.  NEURO: Alert and oriented, cooperative.  SKIN: Warm and dry.        Data:     LIPID RESULTS:  Lab Results   Component Value Date    CHOL 172 03/10/2025    CHOL 161 12/18/2018    HDL 72 03/10/2025    HDL 68 12/18/2018    LDL 78 03/10/2025    LDL 83 12/18/2018    TRIG 111 03/10/2025    TRIG 99 02/28/2023    TRIG 48 12/18/2018    CHOLHDLRATIO 3.7 11/09/2015     LIVER ENZYME RESULTS:  Lab Results   Component Value Date    AST 32 05/18/2020    ALT 23 01/11/2024    ALT 31 05/18/2020     CBC RESULTS:  No results found for: \"WBC\", \"RBC\", \"HGB\", \"HCT\", \"MCV\", \"MCH\", \"MCHC\", \"RDW\", \"PLT\"  BMP RESULTS:  Lab Results   Component Value Date     05/01/2025     12/18/2018    POTASSIUM 4.3 05/01/2025    POTASSIUM 4.1 11/17/2022    POTASSIUM 4.3 12/18/2018    CHLORIDE 99 05/01/2025    CHLORIDE 106 11/17/2022    CHLORIDE 108 12/18/2018    CO2 28 05/01/2025    CO2 25 11/17/2022    CO2 27 12/18/2018    ANIONGAP 9 05/01/2025    ANIONGAP 6 11/17/2022    ANIONGAP 4 12/18/2018     (H) 05/01/2025    GLC 97 11/17/2022     (H) 12/18/2018    BUN 31.0 (H) 05/01/2025    BUN 28 11/17/2022    BUN 19 12/18/2018    CR 1.27 (H) 05/01/2025    CR 1.03 12/18/2018    GFRESTIMATED 60 (L) 05/01/2025    GFRESTIMATED 72 12/18/2018    GFRESTBLACK 87 12/18/2018    GARRISON 10.0 05/01/2025    GARRISON 8.8 12/18/2018      A1C RESULTS:  No results found for: \"A1C\"  INR RESULTS:  No results found for: \"INR\"         Medications     Current Outpatient Medications   Medication Sig Dispense Refill     amLODIPine (NORVASC) 5 MG tablet Take 1 tablet (5 mg) by mouth daily 90 tablet 4     aspirin (ASA) 81 MG EC tablet Take 81 mg by mouth daily  "      famotidine (PEPCID) 20 MG tablet Take 20 mg by mouth daily as needed       hydrochlorothiazide 12.5 MG tablet Take 1 tablet (12.5 mg) by mouth daily. 90 tablet 3     loratadine (CLARITIN) 10 MG tablet Take 10 mg by mouth as needed for allergies.       losartan (COZAAR) 100 MG tablet Take 1 tablet (100 mg) by mouth daily 90 tablet 4     metoprolol succinate ER (TOPROL XL) 50 MG 24 hr tablet Take 1 tablet (50 mg) by mouth daily. 90 tablet 3     Multiple Vitamins-Minerals (PRESERVISION AREDS 2+MULTI VIT) CAPS Dose is unknown but takes 1 pill a day       rosuvastatin (CRESTOR) 40 MG tablet Take 1 tablet (40 mg) by mouth daily 90 tablet 4          Past Medical History     Past Medical History:   Diagnosis Date     Benign non-nodular prostatic hyperplasia with lower urinary tract symptoms 11/9/2015     Hypertrophic cardiomyopathy (H)      Uncomplicated asthma     as child     Past Surgical History:   Procedure Laterality Date     LASER REVOLIX PHOTOSELECTIVE VAPORIZATION PROSTATE N/A 12/13/2016    Procedure: LASER REVOLIX / QUANTA PHOTOSELECTIVE VAPORIZATION PROSTATE;  Surgeon: Ryan Ruiz MD;  Location:  OR     ORTHOPEDIC SURGERY      knee scopes bilateral     PROSTATE SURGERY       Family History   Problem Relation Age of Onset     Cancer Mother         Pancreatic     Cancer Father         Bladder     Cardiomyopathy Son             Allergies   Seasonal allergies        Brie Shore NP  [facility]       Thank you for allowing me to participate in the care of your patient.      Sincerely,     Brie Shore NP     [facility]  cc:   Cole Burciaga MD  [id number]  [place] [id number]

## 2025-05-01 NOTE — PATIENT INSTRUCTIONS
Today's Recommendations    I will reduce your hydrochlorothiazide with you slight decline in kidney function, down to 12.5mg daily, you can cut your 25mg tablets in half to use them up   Lets increase your metoprolol XL to 50mg daily which will help with blood pressure and your irregular heart rhythms,you can take 2 of your 25 mg tablets to use them up   I would like you to see our heart rhythm specialist given your HCM and the findings on your monitor   Please check your blood pressure at home and call if your blood pressure isn't getting below 140, or you notice low heart rates or dizziness   Continue all other medications without changes.  Please follow up with electrophysiology in 3-4 weeks.    Please send a [employer] message or call [phone] to the RN team with questions or concerns.     Scheduling number [phone]  ANETA Dorsey, CNP

## 2025-05-20 ENCOUNTER — RESULTS FOLLOW-UP (OUTPATIENT)
Dept: CARDIOLOGY | Facility: CLINIC | Age: 75
End: 2025-05-20

## 2025-05-20 ENCOUNTER — OFFICE VISIT (OUTPATIENT)
Dept: CARDIOLOGY | Facility: CLINIC | Age: 75
End: 2025-05-20
Attending: NURSE PRACTITIONER
Payer: MEDICARE

## 2025-05-20 ENCOUNTER — LAB (OUTPATIENT)
Dept: LAB | Facility: CLINIC | Age: 75
End: 2025-05-20
Payer: MEDICARE

## 2025-05-20 VITALS
HEART RATE: 55 BPM | HEIGHT: 69 IN | SYSTOLIC BLOOD PRESSURE: 159 MMHG | DIASTOLIC BLOOD PRESSURE: 89 MMHG | WEIGHT: 221 LBS | OXYGEN SATURATION: 100 % | BODY MASS INDEX: 32.73 KG/M2 | RESPIRATION RATE: 18 BRPM

## 2025-05-20 DIAGNOSIS — I49.3 PVC'S (PREMATURE VENTRICULAR CONTRACTIONS): Primary | ICD-10-CM

## 2025-05-20 DIAGNOSIS — I47.29 NSVT (NONSUSTAINED VENTRICULAR TACHYCARDIA) (H): ICD-10-CM

## 2025-05-20 DIAGNOSIS — I10 BENIGN ESSENTIAL HYPERTENSION: ICD-10-CM

## 2025-05-20 DIAGNOSIS — I42.2 APICAL VARIANT HYPERTROPHIC CARDIOMYOPATHY (H): ICD-10-CM

## 2025-05-20 LAB
ANION GAP SERPL CALCULATED.3IONS-SCNC: 10 MMOL/L (ref 7–15)
BUN SERPL-MCNC: 27 MG/DL (ref 8–23)
CALCIUM SERPL-MCNC: 9.3 MG/DL (ref 8.8–10.4)
CHLORIDE SERPL-SCNC: 103 MMOL/L (ref 98–107)
CREAT SERPL-MCNC: 1.18 MG/DL (ref 0.67–1.17)
EGFRCR SERPLBLD CKD-EPI 2021: 65 ML/MIN/1.73M2
GLUCOSE SERPL-MCNC: 100 MG/DL (ref 70–99)
HCO3 SERPL-SCNC: 25 MMOL/L (ref 22–29)
POTASSIUM SERPL-SCNC: 4.1 MMOL/L (ref 3.4–5.3)
SODIUM SERPL-SCNC: 138 MMOL/L (ref 135–145)

## 2025-05-20 PROCEDURE — 80048 BASIC METABOLIC PNL TOTAL CA: CPT | Performed by: NURSE PRACTITIONER

## 2025-05-20 PROCEDURE — 36415 COLL VENOUS BLD VENIPUNCTURE: CPT | Performed by: NURSE PRACTITIONER

## 2025-05-20 RX ORDER — METOPROLOL SUCCINATE 50 MG/1
25 TABLET, EXTENDED RELEASE ORAL DAILY
COMMUNITY
Start: 2025-05-20

## 2025-05-20 RX ORDER — VERAPAMIL HYDROCHLORIDE 120 MG/1
120 CAPSULE, EXTENDED RELEASE ORAL AT BEDTIME
Qty: 30 CAPSULE | Refills: 3 | Status: SHIPPED | OUTPATIENT
Start: 2025-05-20

## 2025-05-20 NOTE — LETTER
5/20/2025    Williams Leung MD  [address]  [place] [address]    RE: Rufus Elena       Dear Colleague,     I had the pleasure of seeing Rufus Elena in the [facility].  [facility] HEART [facility]  Cardiac Electrophysiology Clinic    Rufus Elena MRN# [id number]   YOB: 1950 Age: 75 year old       I had the pleasure of seeing Rufus Elena  who is a 75 year old male with history of hypertension, hyperlipidemia, CKD stage II, nonobstructive CAD, apical hypertrophic cardiomyopathy, NSVT and frequent PVCs.  He follows with Dr. Burciaga in cardiology clinic and most recently saw Milka Shore on 5/1/2025.  He had a cardiac MRI in 2016 showing LV thickness of 1.6 centimeters with patchy delayed enhancement.  He had a CT coronary angiogram in 9/2016 showing mild nonobstructive CAD.  He had a Zio patch monitor performed in 2/2025 for dizziness, which showed frequent PVCs with 22% burden.  His repeat monitor from 4/2025 showed 16% PVC burden with 3 runs of NSVT up to 7 beats.  His hydrochlorothiazide dose was decreased and metoprolol was increased when he saw Milka on 5/1/2025.  He is referred for consideration of ICD implant.        Today's Visit:  He has been doing well and has no complaints today.  He had dizziness last fall/winter.  He had pulse rates down to the 30's and initially was told that he was dehydrated.  He has been drinking more water which has helped.  He was not on metoprolol yet at that time.  He was started on metoprolol after his first heart monitor resulted.  Since then, the metoprolol dose was increased and he does not feel well with the medication.  He feels more tired and can't do his usual activities as well, like mountain biking    He does have a BP cuff at home and has noted higher blood pressures recently, typically in the 130's systolic.   He has been on verapamil in the past.  He was previously on both metoprolol and verapamil, which were stopped at the  same time because he felt exertional fatigue.     Family history: father with HCM, lived into his 90's.  His son also has HCM, currently in his 40's and had an ICD implanted last year because he had some scarring seen in his heart.      Diagnostic Testing:  EKG 3/28/2025-sinus rhythm with occasional PVCs rate 60 bpm.  , QRS 76, QTc 454 ms  EKG 3/10/2025-sinus rhythm with frequent PVCs in bigeminy, rate 64 bpm.      Zio patch monitor 4/2025- personally reviewed  7-day monitor  Baseline sinus rhythm with heart rates ranging , average 65 bpm  3 runs of NSVT, longest lasting 7 beats  32 runs of SVT, longest lasting 13 beats  PVC burden 15.7%      Zio patch monitor 2/2025- personally reviewed  3-day monitor  Baseline sinus rhythm with heart rates ranging , average 57 bpm  PVC burden 22%    Cardiac MRI 3/28/2025  1. Regadenoson stress cardiac MRI is negative for myocardial ischemia..  2. Cardiac MRI findings consistent with apical hypertrophic cardiomyopathy.  Apical left ventricular  hypertrophy (1.5 cm) is present.  3. Normal left ventricular size with hyperdynamic systolic function.  Calculated ejection fraction is 71%.  4.  No regional wall motion abnormality.  No evidence of left ventricular apical aneurysm.  5.  Normal right ventricular size and systolic function.  6.  No significant valve disease.  7. Late gadolinium enhancement images demonstrate mild mid-myocardial patchy heterogenous fibrosis in the apical segments; quantification challenging due to poor image quality (visually <10%).     Echocardiogram 11/17/2022  1. The left ventricle is normal in structure, function and size. The visual  ejection fraction is estimated at 55%.  2. Apical hypertrophy consisent with apical hypertrophic cardiomyopathy.  3. The right ventricle is normal in structure, function and size.  4. No valve disease.  5. The ascending aorta is Borderline dilated. 3.9cm.     No changes compared to echo 12/2019.    CT  coronary angiogram 9/28/2016  1. Total Agatston score 413.16, placing the patient in the 80th  percentile when compared to age and gender matched control group.     2. In the mid LAD there is a less than 25% calcified lesion noted.  There is trivial nonobstructive plaque seen in the left circumflex,  and right coronary artery.    Cardiac MRI 9/21/2016  1.  Normal left ventricular size and systolic function with a  calculated ejection fraction of  65 %. There are no regional wall  motion abnormalities.  2.  There is moderate to severe apical left ventricular hypertrophy,  with the following thickness measurements at this level : apical  septum 13 mm, apical anterior wall 13 mm, apical inferior wall 12 mm,  apical lateral wall 16 mm. Left ventricular wall thickness is within  normal limits at the mid to basal left ventricular level. These  findings are consistent with apical hypertrophic cardiomyopathy.   3.  Normal right ventricular size and systolic function with a  calculated ejection fraction of 58%.   4.  On delayed enhancement imaging, there is a small patchy area of  hyperenhancement involving the left ventricular apex, a pattern  consistent with apical hypertrophic cardiomyopathy.          Last Comprehensive Metabolic Panel:  Lab Results   Component Value Date     05/20/2025    POTASSIUM 4.1 05/20/2025    CHLORIDE 103 05/20/2025    CO2 25 05/20/2025    ANIONGAP 10 05/20/2025     (H) 05/20/2025    BUN 27.0 (H) 05/20/2025    CR 1.18 (H) 05/20/2025    GFRESTIMATED 65 05/20/2025    GARRISON 9.3 05/20/2025        Magnesium   Date Value Ref Range Status   03/10/2025 2.1 1.7 - 2.3 mg/dL Final             Assessment/Plan:    75 year old male with history of hypertension, hyperlipidemia, CKD stage II, nonobstructive CAD, apical hypertrophic cardiomyopathy, NSVT and frequent PVCs.  His cardiac MRI from 3/2025 showed apical hypertrophic cardiomyopathy with maximum thickness measured at 1.5 cm (prior MRI from 2016  showed maximum thickness of 1.6 cm).  There was mild scarring, visually less than 10%.  He has normal LV function and no significant valvular disease.  His Zio patch monitor from 2/2025 showed PVC burden of 22%.  The repeat monitor after starting metoprolol showed PVC burden of 15.7% with short runs of NSVT lasting up to 7 beats.  ~2% risk based on AHA HCM sudden cardiac death risk calculator.    We discussed the findings of his recent heart testing.  We discussed the risk of sudden cardiac death in patients who have hypertrophic cardiomyopathy.  His risk is currently still relatively low, although it would be reasonable to consider ICD implant for primary prevention.  I explained the purpose of the device, long-term implications, and implant procedure in detail including risks and benefits.  He is somewhat hesitant to have this right now, but will think about it more.  I did recommend titrating him off metoprolol, which he is not tolerating very well.  We will start verapamil for the PVCs and hypertension.  I encouraged him to contact us if he has any side effects with the medication.  He will continue the hydrochlorothiazide for now.  His creatinine did improve slightly after the dose was reduced.  I would consider stopping hydrochlorothiazide if we are able to have adequate BP control on verapamil.      Decrease metoprolol to 25 mg daily, anticipate titrating off in the next 1-2 weeks  Start verapamil 120 mg daily, will try to titrate this up if tolerated  Continue hydrochlorothiazide for now, consider stopping if BP is controlled on verapamil  Continue monitoring BP at home, advised him to contact us with update in a couple of weeks  Repeat Ziopatch monitor in 3 months   Follow up in 6 months       Ketan Herrera MD        Orders this Visit:  Orders Placed This Encounter   Procedures     Follow-Up with Cardiology EP     Orders Placed This Encounter   Medications     verapamil ER (VERELAN) 120 MG 24 hr capsule      Sig: Take 1 capsule (120 mg) by mouth at bedtime.     Dispense:  30 capsule     Refill:  3     metoprolol succinate ER (TOPROL XL) 50 MG 24 hr tablet     Sig: Take 0.5 tablets (25 mg) by mouth daily.     Medications Discontinued During This Encounter   Medication Reason     metoprolol succinate ER (TOPROL XL) 50 MG 24 hr tablet        Encounter Diagnoses   Name Primary?     Apical variant hypertrophic cardiomyopathy (H)      PVC's (premature ventricular contractions) Yes     NSVT (nonsustained ventricular tachycardia) (H)      Benign essential hypertension      Today's clinic visit entailed:  Review of the result(s) of each unique test - echo, MRI  The following tests were independently interpreted by me as noted in my documentation: EKG, heart monitors  50 minutes spent by me on the date of the encounter doing chart review, history and exam, documentation and further activities per the note  The longitudinal plan of care for the diagnosis(es)/condition(s) as documented were addressed during this visit. Due to the added complexity in care, I will continue to support Darnell in the subsequent management and with ongoing continuity of care.      CURRENT MEDICATIONS:  Current Outpatient Medications   Medication Sig Dispense Refill     amLODIPine (NORVASC) 5 MG tablet Take 1 tablet (5 mg) by mouth daily 90 tablet 4     aspirin (ASA) 81 MG EC tablet Take 81 mg by mouth daily       famotidine (PEPCID) 20 MG tablet Take 20 mg by mouth daily as needed       hydrochlorothiazide 12.5 MG tablet Take 1 tablet (12.5 mg) by mouth daily. 90 tablet 3     loratadine (CLARITIN) 10 MG tablet Take 10 mg by mouth as needed for allergies.       losartan (COZAAR) 100 MG tablet Take 1 tablet (100 mg) by mouth daily 90 tablet 4     metoprolol succinate ER (TOPROL XL) 50 MG 24 hr tablet Take 1 tablet (50 mg) by mouth daily. 90 tablet 3     Multiple Vitamins-Minerals (PRESERVISION AREDS 2+MULTI VIT) CAPS Dose is unknown but takes 1 pill a day  "      rosuvastatin (CRESTOR) 40 MG tablet Take 1 tablet (40 mg) by mouth daily 90 tablet 4       Review of Systems:  Pertinent review of system as stated above in HPI    Skin:        Eyes:       ENT:       Respiratory:       Cardiovascular:       Gastroenterology:      Genitourinary:       Musculoskeletal:       Neurologic:       Psychiatric:       Heme/Lymph/Imm:       Endocrine:         Physical Exam:  Vitals: BP (!) 159/89   Pulse 55   Resp 18   Ht 1.753 m (5' 9\")   Wt 100.2 kg (221 lb)   SpO2 100%   BMI 32.64 kg/m      Constitutional: Pleasant, no apparent distress.  Respiratory: Breathing non-labored.   Cardiovascular:  Regular rate and rhythm  Neurologic: No gross motor deficits.   Psychiatric: Affect appropriate.        ALLERGIES  Allergies   Allergen Reactions     Seasonal Allergies        PAST MEDICAL HISTORY:  Past Medical History:   Diagnosis Date     Benign non-nodular prostatic hyperplasia with lower urinary tract symptoms 2015     Hypertrophic cardiomyopathy (H)      Uncomplicated asthma     as child       PAST SURGICAL HISTORY:  Past Surgical History:   Procedure Laterality Date     LASER REVOLIX PHOTOSELECTIVE VAPORIZATION PROSTATE N/A 2016    Procedure: LASER REVOLIX / QUANTA PHOTOSELECTIVE VAPORIZATION PROSTATE;  Surgeon: Ryan Ruiz MD;  Location:  OR     ORTHOPEDIC SURGERY      knee scopes bilateral     PROSTATE SURGERY         FAMILY HISTORY:  Family History   Problem Relation Age of Onset     Cancer Mother         Pancreatic     Cancer Father         Bladder     Cardiomyopathy Son        SOCIAL HISTORY:  Social History     Socioeconomic History     Marital status:    Tobacco Use     Smoking status: Former     Current packs/day: 0.00     Types: Cigarettes     Quit date:      Years since quittin.4     Smokeless tobacco: Never     Tobacco comments:     quit at age 21   Substance and Sexual Activity     Alcohol use: Yes     Comment: 2-3 beer day, [employer] " light     Drug use: No     Sexual activity: Yes     Partners: Female   Other Topics Concern     Caffeine Concern No     Comment: 3 coffee in the mornings     Exercise Yes     Comment: walking/biking- 2 hours a day.     Social Drivers of Health      Received from [facility] [facility]    Financial Resource Strain    Received from [facility] & [facility]    Social Connections             CC  Brie Shore NP  [address] [id number]    Thank you for allowing me to participate in the care of your patient.      Sincerely,     Ketan Herrera MD     [facility]  cc:   Brie Shore NP  [address] [id number]

## 2025-05-20 NOTE — PROGRESS NOTES
[facility] HEART CLINIC  Cardiac Electrophysiology Clinic    Rufus Elena MRN# [id number]   YOB: 1950 Age: 75 year old       I had the pleasure of seeing Rufus Elena  who is a 75 year old male with history of hypertension, hyperlipidemia, CKD stage II, nonobstructive CAD, apical hypertrophic cardiomyopathy, NSVT and frequent PVCs.  He follows with Dr. Burciaga in cardiology clinic and most recently saw Milka Shore on 5/1/2025.  He had a cardiac MRI in 2016 showing LV thickness of 1.6 centimeters with patchy delayed enhancement.  He had a CT coronary angiogram in 9/2016 showing mild nonobstructive CAD.  He had a Zio patch monitor performed in 2/2025 for dizziness, which showed frequent PVCs with 22% burden.  His repeat monitor from 4/2025 showed 16% PVC burden with 3 runs of NSVT up to 7 beats.  His hydrochlorothiazide dose was decreased and metoprolol was increased when he saw Milka on 5/1/2025.  He is referred for consideration of ICD implant.        Today's Visit:  He has been doing well and has no complaints today.  He had dizziness last fall/winter.  He had pulse rates down to the 30's and initially was told that he was dehydrated.  He has been drinking more water which has helped.  He was not on metoprolol yet at that time.  He was started on metoprolol after his first heart monitor resulted.  Since then, the metoprolol dose was increased and he does not feel well with the medication.  He feels more tired and can't do his usual activities as well, like mountain biking    He does have a BP cuff at home and has noted higher blood pressures recently, typically in the 130's systolic.   He has been on verapamil in the past.  He was previously on both metoprolol and verapamil, which were stopped at the same time because he felt exertional fatigue.     Family history: father with HCM, lived into his 90's.  His son also has HCM, currently in his 40's and had an ICD implanted last year because he  had some scarring seen in his heart.      Diagnostic Testing:  EKG 3/28/2025-sinus rhythm with occasional PVCs rate 60 bpm.  , QRS 76, QTc 454 ms  EKG 3/10/2025-sinus rhythm with frequent PVCs in bigeminy, rate 64 bpm.      Zio patch monitor 4/2025- personally reviewed  7-day monitor  Baseline sinus rhythm with heart rates ranging , average 65 bpm  3 runs of NSVT, longest lasting 7 beats  32 runs of SVT, longest lasting 13 beats  PVC burden 15.7%      Zio patch monitor 2/2025- personally reviewed  3-day monitor  Baseline sinus rhythm with heart rates ranging , average 57 bpm  PVC burden 22%    Cardiac MRI 3/28/2025  1. Regadenoson stress cardiac MRI is negative for myocardial ischemia..  2. Cardiac MRI findings consistent with apical hypertrophic cardiomyopathy.  Apical left ventricular  hypertrophy (1.5 cm) is present.  3. Normal left ventricular size with hyperdynamic systolic function.  Calculated ejection fraction is 71%.  4.  No regional wall motion abnormality.  No evidence of left ventricular apical aneurysm.  5.  Normal right ventricular size and systolic function.  6.  No significant valve disease.  7. Late gadolinium enhancement images demonstrate mild mid-myocardial patchy heterogenous fibrosis in the apical segments; quantification challenging due to poor image quality (visually <10%).     Echocardiogram 11/17/2022  1. The left ventricle is normal in structure, function and size. The visual  ejection fraction is estimated at 55%.  2. Apical hypertrophy consisent with apical hypertrophic cardiomyopathy.  3. The right ventricle is normal in structure, function and size.  4. No valve disease.  5. The ascending aorta is Borderline dilated. 3.9cm.     No changes compared to echo 12/2019.    CT coronary angiogram 9/28/2016  1. Total Agatston score 413.16, placing the patient in the 80th  percentile when compared to age and gender matched control group.     2. In the mid LAD there is a less  than 25% calcified lesion noted.  There is trivial nonobstructive plaque seen in the left circumflex,  and right coronary artery.    Cardiac MRI 9/21/2016  1.  Normal left ventricular size and systolic function with a  calculated ejection fraction of  65 %. There are no regional wall  motion abnormalities.  2.  There is moderate to severe apical left ventricular hypertrophy,  with the following thickness measurements at this level : apical  septum 13 mm, apical anterior wall 13 mm, apical inferior wall 12 mm,  apical lateral wall 16 mm. Left ventricular wall thickness is within  normal limits at the mid to basal left ventricular level. These  findings are consistent with apical hypertrophic cardiomyopathy.   3.  Normal right ventricular size and systolic function with a  calculated ejection fraction of 58%.   4.  On delayed enhancement imaging, there is a small patchy area of  hyperenhancement involving the left ventricular apex, a pattern  consistent with apical hypertrophic cardiomyopathy.          Last Comprehensive Metabolic Panel:  Lab Results   Component Value Date     05/20/2025    POTASSIUM 4.1 05/20/2025    CHLORIDE 103 05/20/2025    CO2 25 05/20/2025    ANIONGAP 10 05/20/2025     (H) 05/20/2025    BUN 27.0 (H) 05/20/2025    CR 1.18 (H) 05/20/2025    GFRESTIMATED 65 05/20/2025    GARRISON 9.3 05/20/2025        Magnesium   Date Value Ref Range Status   03/10/2025 2.1 1.7 - 2.3 mg/dL Final             Assessment/Plan:    75 year old male with history of hypertension, hyperlipidemia, CKD stage II, nonobstructive CAD, apical hypertrophic cardiomyopathy, NSVT and frequent PVCs.  His cardiac MRI from 3/2025 showed apical hypertrophic cardiomyopathy with maximum thickness measured at 1.5 cm (prior MRI from 2016 showed maximum thickness of 1.6 cm).  There was mild scarring, visually less than 10%.  He has normal LV function and no significant valvular disease.  His Zio patch monitor from 2/2025 showed PVC  burden of 22%.  The repeat monitor after starting metoprolol showed PVC burden of 15.7% with short runs of NSVT lasting up to 7 beats.  ~2% risk based on AHA HCM sudden cardiac death risk calculator.    We discussed the findings of his recent heart testing.  We discussed the risk of sudden cardiac death in patients who have hypertrophic cardiomyopathy.  His risk is currently still relatively low, although it would be reasonable to consider ICD implant for primary prevention.  I explained the purpose of the device, long-term implications, and implant procedure in detail including risks and benefits.  He is somewhat hesitant to have this right now, but will think about it more.  I did recommend titrating him off metoprolol, which he is not tolerating very well.  We will start verapamil for the PVCs and hypertension.  I encouraged him to contact us if he has any side effects with the medication.  He will continue the hydrochlorothiazide for now.  His creatinine did improve slightly after the dose was reduced.  I would consider stopping hydrochlorothiazide if we are able to have adequate BP control on verapamil.      Decrease metoprolol to 25 mg daily, anticipate titrating off in the next 1-2 weeks  Start verapamil 120 mg daily, will try to titrate this up if tolerated  Continue hydrochlorothiazide for now, consider stopping if BP is controlled on verapamil  Continue monitoring BP at home, advised him to contact us with update in a couple of weeks  Repeat Ziopatch monitor in 3 months   Follow up in 6 months       Ketan Herrera MD        Orders this Visit:  Orders Placed This Encounter   Procedures    Follow-Up with Cardiology EP     Orders Placed This Encounter   Medications    verapamil ER (VERELAN) 120 MG 24 hr capsule     Sig: Take 1 capsule (120 mg) by mouth at bedtime.     Dispense:  30 capsule     Refill:  3    metoprolol succinate ER (TOPROL XL) 50 MG 24 hr tablet     Sig: Take 0.5 tablets (25 mg) by mouth daily.      Medications Discontinued During This Encounter   Medication Reason    metoprolol succinate ER (TOPROL XL) 50 MG 24 hr tablet        Encounter Diagnoses   Name Primary?    Apical variant hypertrophic cardiomyopathy (H)     PVC's (premature ventricular contractions) Yes    NSVT (nonsustained ventricular tachycardia) (H)     Benign essential hypertension      Today's clinic visit entailed:  Review of the result(s) of each unique test - echo, MRI  The following tests were independently interpreted by me as noted in my documentation: EKG, heart monitors  50 minutes spent by me on the date of the encounter doing chart review, history and exam, documentation and further activities per the note  The longitudinal plan of care for the diagnosis(es)/condition(s) as documented were addressed during this visit. Due to the added complexity in care, I will continue to support Darnell in the subsequent management and with ongoing continuity of care.      CURRENT MEDICATIONS:  Current Outpatient Medications   Medication Sig Dispense Refill    amLODIPine (NORVASC) 5 MG tablet Take 1 tablet (5 mg) by mouth daily 90 tablet 4    aspirin (ASA) 81 MG EC tablet Take 81 mg by mouth daily      famotidine (PEPCID) 20 MG tablet Take 20 mg by mouth daily as needed      hydrochlorothiazide 12.5 MG tablet Take 1 tablet (12.5 mg) by mouth daily. 90 tablet 3    loratadine (CLARITIN) 10 MG tablet Take 10 mg by mouth as needed for allergies.      losartan (COZAAR) 100 MG tablet Take 1 tablet (100 mg) by mouth daily 90 tablet 4    metoprolol succinate ER (TOPROL XL) 50 MG 24 hr tablet Take 1 tablet (50 mg) by mouth daily. 90 tablet 3    Multiple Vitamins-Minerals (PRESERVISION AREDS 2+MULTI VIT) CAPS Dose is unknown but takes 1 pill a day      rosuvastatin (CRESTOR) 40 MG tablet Take 1 tablet (40 mg) by mouth daily 90 tablet 4       Review of Systems:  Pertinent review of system as stated above in HPI    Skin:        Eyes:       ENT:      "  Respiratory:       Cardiovascular:       Gastroenterology:      Genitourinary:       Musculoskeletal:       Neurologic:       Psychiatric:       Heme/Lymph/Imm:       Endocrine:         Physical Exam:  Vitals: BP (!) 159/89   Pulse 55   Resp 18   Ht 1.753 m (5' 9\")   Wt 100.2 kg (221 lb)   SpO2 100%   BMI 32.64 kg/m      Constitutional: Pleasant, no apparent distress.  Respiratory: Breathing non-labored.   Cardiovascular:  Regular rate and rhythm  Neurologic: No gross motor deficits.   Psychiatric: Affect appropriate.        ALLERGIES  Allergies   Allergen Reactions    Seasonal Allergies        PAST MEDICAL HISTORY:  Past Medical History:   Diagnosis Date    Benign non-nodular prostatic hyperplasia with lower urinary tract symptoms 2015    Hypertrophic cardiomyopathy (H)     Uncomplicated asthma     as child       PAST SURGICAL HISTORY:  Past Surgical History:   Procedure Laterality Date    LASER REVOLIX PHOTOSELECTIVE VAPORIZATION PROSTATE N/A 2016    Procedure: LASER REVOLIX / QUANTA PHOTOSELECTIVE VAPORIZATION PROSTATE;  Surgeon: Ryan Ruiz MD;  Location: [facility] OR    ORTHOPEDIC SURGERY      knee scopes bilateral    PROSTATE SURGERY         FAMILY HISTORY:  Family History   Problem Relation Age of Onset    Cancer Mother         Pancreatic    Cancer Father         Bladder    Cardiomyopathy Son        SOCIAL HISTORY:  Social History     Socioeconomic History    Marital status:    Tobacco Use    Smoking status: Former     Current packs/day: 0.00     Types: Cigarettes     Quit date:      Years since quittin.4    Smokeless tobacco: Never    Tobacco comments:     quit at age 21   Substance and Sexual Activity    Alcohol use: Yes     Comment: 2-3 beer day, [employer] light    Drug use: No    Sexual activity: Yes     Partners: Female   Other Topics Concern    Caffeine Concern No     Comment: 3 coffee in the mornings    Exercise Yes     Comment: walking/biking- 2 hours a day. "     Social Drivers of Health      Received from [facility]    Financial Resource Strain    Received from [facility]    Social Connections             [place]  [address] [id number]

## 2025-05-20 NOTE — PATIENT INSTRUCTIONS
Today's Recommendations    Decrease metoprolol to 25 mg daily, we will plan on stopping it in 1-2 weeks  Start verapamil 120 mg daily  Please monitor your blood pressures and send me an update in 2 weeks  We will repeat a Ziopatch monitor prior to our follow up  Please follow up with me in 6 months    Please send [employer] message or call [phone] for the EP nurses with questions or concerns.     Scheduling and after hours number [phone]

## 2025-06-02 ENCOUNTER — TELEPHONE (OUTPATIENT)
Dept: CARDIOLOGY | Facility: CLINIC | Age: 75
End: 2025-06-02
Payer: MEDICARE

## 2025-06-02 DIAGNOSIS — I10 BENIGN ESSENTIAL HYPERTENSION: ICD-10-CM

## 2025-06-02 DIAGNOSIS — E78.5 HYPERLIPIDEMIA WITH TARGET LDL LESS THAN 70: ICD-10-CM

## 2025-06-02 RX ORDER — ROSUVASTATIN CALCIUM 40 MG/1
40 TABLET, COATED ORAL DAILY
Qty: 90 TABLET | Refills: 4 | Status: SHIPPED | OUTPATIENT
Start: 2025-06-02

## 2025-06-02 RX ORDER — AMLODIPINE BESYLATE 5 MG/1
5 TABLET ORAL DAILY
Qty: 90 TABLET | Refills: 4 | Status: SHIPPED | OUTPATIENT
Start: 2025-06-02

## 2025-06-02 RX ORDER — LOSARTAN POTASSIUM 100 MG/1
100 TABLET ORAL DAILY
Qty: 90 TABLET | Refills: 4 | Status: SHIPPED | OUTPATIENT
Start: 2025-06-02

## 2025-06-02 NOTE — TELEPHONE ENCOUNTER
Reason for Call:  Medication or medication refill:    Do you use a [facility]?  Name of the pharmacy and phone number for the current request:  [facility] [place]    Name of the medication requested: losartan (COZAAR) 100 MG tablet, amLODIPine (NORVASC) 5 MG tablet & rosuvastatin (CRESTOR) 40 MG tablet    Other request: Please fax refill authorization to [facility] in [place] - [phone]    Can we leave a detailed message on this number? YES    Phone number patient can be reached at: Other phone number:  [phone] *    Best Time:     Call taken on 6/2/2025 at 8:28 AM by Homa Schafer

## 2025-06-04 ENCOUNTER — MYC MEDICAL ADVICE (OUTPATIENT)
Dept: CARDIOLOGY | Facility: CLINIC | Age: 75
End: 2025-06-04
Payer: MEDICARE

## 2025-06-04 DIAGNOSIS — I49.3 PVC'S (PREMATURE VENTRICULAR CONTRACTIONS): ICD-10-CM

## 2025-06-16 RX ORDER — VERAPAMIL HYDROCHLORIDE 120 MG/1
120 CAPSULE, EXTENDED RELEASE ORAL AT BEDTIME
Qty: 90 CAPSULE | Refills: 2 | Status: SHIPPED | OUTPATIENT
Start: 2025-06-16

## 2025-08-20 ENCOUNTER — ORDERS ONLY (AUTO-RELEASED) (OUTPATIENT)
Dept: CARDIOLOGY | Facility: CLINIC | Age: 75
End: 2025-08-20
Payer: MEDICARE

## 2025-08-20 DIAGNOSIS — I49.3 PVC'S (PREMATURE VENTRICULAR CONTRACTIONS): ICD-10-CM

## 2025-08-20 DIAGNOSIS — I47.29 NSVT (NONSUSTAINED VENTRICULAR TACHYCARDIA) (H): ICD-10-CM

## 2025-09-04 LAB — CV ZIO PRELIM RESULTS: NORMAL

## (undated) RX ORDER — REGADENOSON 0.08 MG/ML
INJECTION, SOLUTION INTRAVENOUS
Status: DISPENSED
Start: 2025-03-28